# Patient Record
Sex: MALE | Race: WHITE | NOT HISPANIC OR LATINO | URBAN - NONMETROPOLITAN AREA
[De-identification: names, ages, dates, MRNs, and addresses within clinical notes are randomized per-mention and may not be internally consistent; named-entity substitution may affect disease eponyms.]

---

## 2020-07-22 ENCOUNTER — APPOINTMENT (EMERGENCY)
Dept: CT IMAGING | Facility: HOSPITAL | Age: 55
End: 2020-07-22
Payer: COMMERCIAL

## 2020-07-22 ENCOUNTER — HOSPITAL ENCOUNTER (EMERGENCY)
Facility: HOSPITAL | Age: 55
Discharge: HOME | End: 2020-07-22
Attending: EMERGENCY MEDICINE | Admitting: EMERGENCY MEDICINE
Payer: COMMERCIAL

## 2020-07-22 ENCOUNTER — APPOINTMENT (EMERGENCY)
Dept: RADIOLOGY | Facility: HOSPITAL | Age: 55
End: 2020-07-22
Payer: COMMERCIAL

## 2020-07-22 VITALS
HEIGHT: 65 IN | HEART RATE: 82 BPM | SYSTOLIC BLOOD PRESSURE: 178 MMHG | DIASTOLIC BLOOD PRESSURE: 84 MMHG | OXYGEN SATURATION: 100 % | TEMPERATURE: 97.6 F | RESPIRATION RATE: 20 BRPM | WEIGHT: 305.56 LBS | BODY MASS INDEX: 50.91 KG/M2

## 2020-07-22 DIAGNOSIS — H61.22 IMPACTED CERUMEN OF LEFT EAR: ICD-10-CM

## 2020-07-22 DIAGNOSIS — R42 DIZZINESS: ICD-10-CM

## 2020-07-22 DIAGNOSIS — R59.0 CERVICAL LYMPHADENOPATHY: ICD-10-CM

## 2020-07-22 DIAGNOSIS — R59.0 MEDIASTINAL LYMPHADENOPATHY: ICD-10-CM

## 2020-07-22 DIAGNOSIS — I10 ESSENTIAL HYPERTENSION: Primary | ICD-10-CM

## 2020-07-22 DIAGNOSIS — S00.01XA EXCORIATION OF SCALP, INITIAL ENCOUNTER: ICD-10-CM

## 2020-07-22 LAB
ALBUMIN SERPL BCP-MCNC: 3 G/DL (ref 3.5–5)
ALP SERPL-CCNC: 38 U/L (ref 46–116)
ALT SERPL W P-5'-P-CCNC: 38 U/L (ref 12–78)
ANION GAP SERPL CALCULATED.3IONS-SCNC: 8 MMOL/L (ref 4–13)
AST SERPL W P-5'-P-CCNC: 19 U/L (ref 5–45)
BASOPHILS # BLD AUTO: 0.06 THOUSANDS/ΜL (ref 0–0.1)
BASOPHILS NFR BLD AUTO: 1 % (ref 0–1)
BILIRUB SERPL-MCNC: 0.29 MG/DL (ref 0.2–1)
BUN SERPL-MCNC: 16 MG/DL (ref 5–25)
CALCIUM SERPL-MCNC: 8.3 MG/DL (ref 8.3–10.1)
CHLORIDE SERPL-SCNC: 103 MMOL/L (ref 100–108)
CO2 SERPL-SCNC: 25 MMOL/L (ref 21–32)
CREAT SERPL-MCNC: 1.16 MG/DL (ref 0.6–1.3)
EOSINOPHIL # BLD AUTO: 0.38 THOUSAND/ΜL (ref 0–0.61)
EOSINOPHIL NFR BLD AUTO: 5 % (ref 0–6)
ERYTHROCYTE [DISTWIDTH] IN BLOOD BY AUTOMATED COUNT: 14.6 % (ref 11.6–15.1)
GFR SERPL CREATININE-BSD FRML MDRD: 72 ML/MIN/1.73SQ M
GLUCOSE SERPL-MCNC: 144 MG/DL (ref 65–140)
HCT VFR BLD AUTO: 42.2 % (ref 36.5–49.3)
HGB BLD-MCNC: 13.5 G/DL (ref 12–17)
IMM GRANULOCYTES # BLD AUTO: 0.14 THOUSAND/UL (ref 0–0.2)
IMM GRANULOCYTES NFR BLD AUTO: 2 % (ref 0–2)
LACTATE SERPL-SCNC: 1.1 MMOL/L (ref 0.5–2)
LYMPHOCYTES # BLD AUTO: 0.97 THOUSANDS/ΜL (ref 0.6–4.47)
LYMPHOCYTES NFR BLD AUTO: 13 % (ref 14–44)
MCH RBC QN AUTO: 27.8 PG (ref 26.8–34.3)
MCHC RBC AUTO-ENTMCNC: 32 G/DL (ref 31.4–37.4)
MCV RBC AUTO: 87 FL (ref 82–98)
MONOCYTES # BLD AUTO: 1.16 THOUSAND/ΜL (ref 0.17–1.22)
MONOCYTES NFR BLD AUTO: 15 % (ref 4–12)
NEUTROPHILS # BLD AUTO: 4.93 THOUSANDS/ΜL (ref 1.85–7.62)
NEUTS SEG NFR BLD AUTO: 64 % (ref 43–75)
NRBC BLD AUTO-RTO: 0 /100 WBCS
PLATELET # BLD AUTO: 337 THOUSANDS/UL (ref 149–390)
PMV BLD AUTO: 8.9 FL (ref 8.9–12.7)
POTASSIUM SERPL-SCNC: 3.9 MMOL/L (ref 3.5–5.3)
PROT SERPL-MCNC: 7.6 G/DL (ref 6.4–8.2)
RBC # BLD AUTO: 4.85 MILLION/UL (ref 3.88–5.62)
SODIUM SERPL-SCNC: 136 MMOL/L (ref 136–145)
TROPONIN I SERPL-MCNC: <0.02 NG/ML
WBC # BLD AUTO: 7.64 THOUSAND/UL (ref 4.31–10.16)

## 2020-07-22 PROCEDURE — 90715 TDAP VACCINE 7 YRS/> IM: CPT | Performed by: EMERGENCY MEDICINE

## 2020-07-22 PROCEDURE — 71045 X-RAY EXAM CHEST 1 VIEW: CPT

## 2020-07-22 PROCEDURE — 80053 COMPREHEN METABOLIC PANEL: CPT

## 2020-07-22 PROCEDURE — 85025 COMPLETE CBC W/AUTO DIFF WBC: CPT

## 2020-07-22 PROCEDURE — 70491 CT SOFT TISSUE NECK W/DYE: CPT

## 2020-07-22 PROCEDURE — 69210 REMOVE IMPACTED EAR WAX UNI: CPT | Performed by: EMERGENCY MEDICINE

## 2020-07-22 PROCEDURE — 96374 THER/PROPH/DIAG INJ IV PUSH: CPT

## 2020-07-22 PROCEDURE — 90471 IMMUNIZATION ADMIN: CPT

## 2020-07-22 PROCEDURE — 99284 EMERGENCY DEPT VISIT MOD MDM: CPT

## 2020-07-22 PROCEDURE — 93005 ELECTROCARDIOGRAM TRACING: CPT

## 2020-07-22 PROCEDURE — 83605 ASSAY OF LACTIC ACID: CPT | Performed by: EMERGENCY MEDICINE

## 2020-07-22 PROCEDURE — 99285 EMERGENCY DEPT VISIT HI MDM: CPT | Performed by: EMERGENCY MEDICINE

## 2020-07-22 PROCEDURE — 96361 HYDRATE IV INFUSION ADD-ON: CPT

## 2020-07-22 PROCEDURE — 70450 CT HEAD/BRAIN W/O DYE: CPT

## 2020-07-22 PROCEDURE — 36415 COLL VENOUS BLD VENIPUNCTURE: CPT

## 2020-07-22 PROCEDURE — 84484 ASSAY OF TROPONIN QUANT: CPT

## 2020-07-22 RX ORDER — GINSENG 100 MG
1 CAPSULE ORAL ONCE
Status: COMPLETED | OUTPATIENT
Start: 2020-07-22 | End: 2020-07-22

## 2020-07-22 RX ORDER — LOSARTAN POTASSIUM 50 MG/1
50 TABLET ORAL ONCE
Status: COMPLETED | OUTPATIENT
Start: 2020-07-22 | End: 2020-07-22

## 2020-07-22 RX ORDER — HYDRALAZINE HYDROCHLORIDE 20 MG/ML
10 INJECTION INTRAMUSCULAR; INTRAVENOUS ONCE
Status: COMPLETED | OUTPATIENT
Start: 2020-07-22 | End: 2020-07-22

## 2020-07-22 RX ORDER — CLINDAMYCIN HYDROCHLORIDE 150 MG/1
300 CAPSULE ORAL ONCE
Status: COMPLETED | OUTPATIENT
Start: 2020-07-22 | End: 2020-07-22

## 2020-07-22 RX ORDER — KETOROLAC TROMETHAMINE 30 MG/ML
15 INJECTION, SOLUTION INTRAMUSCULAR; INTRAVENOUS ONCE
Status: COMPLETED | OUTPATIENT
Start: 2020-07-22 | End: 2020-07-22

## 2020-07-22 RX ORDER — MECLIZINE HYDROCHLORIDE 25 MG/1
25 TABLET ORAL 3 TIMES DAILY PRN
Qty: 30 TABLET | Refills: 0 | Status: SHIPPED | OUTPATIENT
Start: 2020-07-22 | End: 2021-05-12

## 2020-07-22 RX ORDER — LISINOPRIL 10 MG/1
30 TABLET ORAL ONCE
Status: COMPLETED | OUTPATIENT
Start: 2020-07-22 | End: 2020-07-22

## 2020-07-22 RX ORDER — CLINDAMYCIN HYDROCHLORIDE 150 MG/1
450 CAPSULE ORAL EVERY 8 HOURS SCHEDULED
Qty: 63 CAPSULE | Refills: 0 | Status: SHIPPED | OUTPATIENT
Start: 2020-07-22 | End: 2020-07-29

## 2020-07-22 RX ADMIN — CLINDAMYCIN HYDROCHLORIDE 300 MG: 150 CAPSULE ORAL at 14:40

## 2020-07-22 RX ADMIN — HYDRALAZINE HYDROCHLORIDE 10 MG: 20 INJECTION INTRAMUSCULAR; INTRAVENOUS at 13:56

## 2020-07-22 RX ADMIN — CARBAMIDE PEROXIDE 6.5% 5 DROP: 6.5 LIQUID AURICULAR (OTIC) at 12:07

## 2020-07-22 RX ADMIN — KETOROLAC TROMETHAMINE 15 MG: 30 INJECTION, SOLUTION INTRAMUSCULAR at 12:07

## 2020-07-22 RX ADMIN — IOHEXOL 100 ML: 350 INJECTION, SOLUTION INTRAVENOUS at 13:52

## 2020-07-22 RX ADMIN — BACITRACIN 1 LARGE APPLICATION: 500 OINTMENT TOPICAL at 12:07

## 2020-07-22 RX ADMIN — LOSARTAN POTASSIUM 50 MG: 50 TABLET, FILM COATED ORAL at 14:40

## 2020-07-22 RX ADMIN — SODIUM CHLORIDE 1000 ML: 0.9 INJECTION, SOLUTION INTRAVENOUS at 12:11

## 2020-07-22 RX ADMIN — LISINOPRIL 30 MG: 10 TABLET ORAL at 14:40

## 2020-07-22 RX ADMIN — TETANUS TOXOID, REDUCED DIPHTHERIA TOXOID AND ACELLULAR PERTUSSIS VACCINE, ADSORBED 0.5 ML: 5; 2.5; 8; 8; 2.5 SUSPENSION INTRAMUSCULAR at 12:07

## 2020-07-22 NOTE — ED NOTES
Pt given ear drops to take home to use, pt's wounds on scalp also treated with bacitracin, pt refused to have head wrapped with dressing        Maci Purcell, RN  07/22/20 3098

## 2020-07-22 NOTE — ED PROVIDER NOTES
History  Chief Complaint   Patient presents with    Neck Swelling     "I  had lost a few teeth in the back on the left  NOw i feel slooen lymph nodes and i can feel like pus in my hair and my scalp is on fire "     61-year-old male with a past medical history of hypertension and thyroid disease presents with dental problem one month ago and left-sided neck swelling x1 week  Patient also complaining of oozing from my scalp," of unknown duration  Patient states that about a month ago he had two teeth fall out of his mouth from the top left side  Patient admits to having very poor dentition  Patient states about a week ago he noticed to balls in my neck    Patient states that he also uses a shampoo that makes his head itch and he admits to scratching his head vigorously  Shortly thereafter he noticed burning and oozing from my head!" Patient denies history of cancer, otitis externa, mastoiditis, shingles or recent infections  Patient denies fever, chills, neck stiffness, headache, visual or hearing changes, ear pain, neck pain, difficulty breathing, cough, sputum production, shortness of breath, chest pain, rash, back pain, abdominal pain, urinary symptoms, nausea, vomiting, diarrhea  Denies focal motor sensory deficits  Denies neck trauma, fall or syncope  No medications taken at home  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 due to COVID-19 pandemic including Bonnet, face mask, eye goggles and gloves        History provided by:  Patient   used: No    Neck Pain   Pain location:  L side  Quality:  Unable to specify  Pain severity:  Unable to specify  Onset quality:  Gradual  Duration:  1 week  Timing:  Constant  Progression:  Worsening  Chronicity:  New  Context: not fall, not jumping from heights, not lifting a heavy object, not MCA, not MVC, not pedestrian accident and not recent injury    Relieved by:  Nothing  Worsened by:  Nothing  Ineffective treatments:  None tried  Associated symptoms: no bladder incontinence, no bowel incontinence, no chest pain, no fever, no headaches, no leg pain, no numbness, no paresis, no photophobia, no syncope, no tingling, no visual change, no weakness and no weight loss    Risk factors: no hx of head and neck radiation, no hx of osteoporosis, no hx of spinal trauma, no recent epidural, no recent head injury and no recurrent falls        None       Past Medical History:   Diagnosis Date    Disease of thyroid gland     Hypertension        History reviewed  No pertinent surgical history  History reviewed  No pertinent family history  I have reviewed and agree with the history as documented  E-Cigarette/Vaping    E-Cigarette Use Never User      E-Cigarette/Vaping Substances     Social History     Tobacco Use    Smoking status: Never Smoker    Smokeless tobacco: Never Used   Substance Use Topics    Alcohol use: Yes     Frequency: 2-3 times a week    Drug use: Never       Review of Systems   Constitutional: Negative for chills, diaphoresis, fatigue, fever and weight loss  HENT: Positive for dental problem and facial swelling (Two lumps in left lateral neck)  Negative for congestion, drooling, ear discharge, ear pain, hearing loss, mouth sores, nosebleeds, postnasal drip, rhinorrhea, sinus pressure, sinus pain, sneezing, sore throat, tinnitus, trouble swallowing and voice change  Eyes: Negative for photophobia, pain, redness and visual disturbance  Respiratory: Negative for cough, choking, chest tightness, shortness of breath, wheezing and stridor  Cardiovascular: Negative for chest pain, palpitations, leg swelling and syncope  Gastrointestinal: Negative for abdominal pain, bowel incontinence, constipation, diarrhea, nausea and vomiting     Genitourinary: Negative for bladder incontinence, decreased urine volume, difficulty urinating, discharge, dysuria, flank pain, frequency, hematuria, penile pain, penile swelling, scrotal swelling, testicular pain and urgency  Musculoskeletal: Negative for arthralgias, back pain, gait problem, joint swelling, myalgias, neck pain and neck stiffness  Skin: Positive for wound  Negative for color change, pallor and rash  Allergic/Immunologic: Negative for immunocompromised state  Neurological: Negative for dizziness, tingling, tremors, seizures, syncope, facial asymmetry, speech difficulty, weakness, light-headedness, numbness and headaches  Hematological: Negative for adenopathy  Psychiatric/Behavioral: Negative for agitation, confusion, hallucinations and suicidal ideas  The patient is not nervous/anxious  Physical Exam  Physical Exam   Constitutional: He is oriented to person, place, and time  He appears well-developed and well-nourished  He is cooperative  Non-toxic appearance  He does not have a sickly appearance  He does not appear ill  No distress  HENT:   Head: Normocephalic  Head is with laceration  Head is without raccoon's eyes, without Almazan's sign, without abrasion, without contusion, without right periorbital erythema and without left periorbital erythema  Hair is normal        Right Ear: Hearing, tympanic membrane, external ear and ear canal normal  No drainage or tenderness  No mastoid tenderness  Tympanic membrane is not injected, not scarred, not perforated, not erythematous, not retracted and not bulging  Tympanic membrane mobility is normal  No middle ear effusion  No hemotympanum  Left Ear: Hearing, tympanic membrane and ear canal normal  There is drainage  No tenderness  There is mastoid tenderness  Tympanic membrane is not injected, not scarred, not perforated, not erythematous, not retracted and not bulging  Tympanic membrane mobility is normal  No hemotympanum  Nose: Nose normal  Right sinus exhibits no maxillary sinus tenderness and no frontal sinus tenderness  Left sinus exhibits no maxillary sinus tenderness and no frontal sinus tenderness  Mouth/Throat: Uvula is midline, oropharynx is clear and moist and mucous membranes are normal  No oral lesions  No trismus in the jaw  Abnormal dentition  Dental caries present  No dental abscesses, uvula swelling or lacerations  No oropharyngeal exudate, posterior oropharyngeal edema, posterior oropharyngeal erythema or tonsillar abscesses  No tonsillar exudate  Multiple areas of excoriation to occipital scalp; serosanguinous oozing; no crepitus, step-off, deformity; no bleeding; no rash, erythema; no contusions; left ear has impacted cerumen; very mild tenderness and erythema around left mastoid; no oral lesions, abscess   Eyes: Pupils are equal, round, and reactive to light  Conjunctivae, EOM and lids are normal    Neck: Trachea normal, normal range of motion, full passive range of motion without pain and phonation normal  Neck supple  No JVD present  No spinous process tenderness and no muscular tenderness present  No neck rigidity  No edema, no erythema and normal range of motion present  No Brudzinski's sign and no Kernig's sign noted  No thyroid mass and no thyromegaly present  Multiple non-tender, mobile lymph nodes palpated   Cardiovascular: Normal rate, regular rhythm, S1 normal, S2 normal, normal heart sounds, intact distal pulses and normal pulses  Pulses:       Radial pulses are 2+ on the right side, and 2+ on the left side  Dorsalis pedis pulses are 2+ on the right side, and 2+ on the left side  Pulmonary/Chest: Effort normal and breath sounds normal  No accessory muscle usage or stridor  No tachypnea  No respiratory distress  He has no decreased breath sounds  He has no wheezes  He has no rhonchi  He has no rales  He exhibits no mass, no tenderness, no deformity and no retraction  Abdominal: Soft  Bowel sounds are normal  He exhibits no distension, no ascites and no mass  There is no hepatosplenomegaly  There is no tenderness   There is no rigidity, no rebound, no guarding, no CVA tenderness, no tenderness at McBurney's point and negative Burroughs's sign  No hernia  Genitourinary: Penis normal  No penile tenderness  Musculoskeletal: Normal range of motion  He exhibits no edema, tenderness or deformity  Lymphadenopathy:        Head (left side): Preauricular adenopathy present  He has cervical adenopathy  Left cervical: Deep cervical adenopathy present  Left: Supraclavicular adenopathy present  Neurological: He is alert and oriented to person, place, and time  He has normal strength  He is not disoriented  He displays no atrophy and no tremor  No cranial nerve deficit or sensory deficit  He exhibits normal muscle tone  He displays a negative Romberg sign  He displays no seizure activity  Coordination and gait normal  GCS eye subscore is 4  GCS verbal subscore is 5  GCS motor subscore is 6  Patient is AAOx4, GCS 15; speaking clearly and appropriately; motor and sensation intact; visual fields intact; cranial nerves II-XII grossly intact; no facial droop, slurred speech or arm drift   Skin: Skin is warm, dry and intact  Capillary refill takes less than 2 seconds  No abrasion, no bruising, no burn, no ecchymosis, no laceration, no lesion, no petechiae and no rash noted  Rash is not maculopapular  He is not diaphoretic  No erythema  No pallor  Psychiatric: He has a normal mood and affect  His speech is normal and behavior is normal  Judgment and thought content normal  He is not actively hallucinating  Cognition and memory are normal  He is attentive  Nursing note and vitals reviewed        Vital Signs  ED Triage Vitals [07/22/20 1108]   Temperature Pulse Respirations Blood Pressure SpO2   97 6 °F (36 4 °C) 84 22 (!) 193/109 98 %      Temp Source Heart Rate Source Patient Position - Orthostatic VS BP Location FiO2 (%)   Temporal Monitor Lying Right arm --      Pain Score       6           Vitals:    07/22/20 1108 07/22/20 1230 07/22/20 1300 07/22/20 1430   BP: (!) 193/109 (!) 181/96 (!) 190/103 (!) 178/84   Pulse: 84 79 74 82   Patient Position - Orthostatic VS: Lying Lying Lying Lying         Visual Acuity      ED Medications  Medications   tetanus-diphtheria-acellular pertussis (BOOSTRIX) IM injection 0 5 mL (0 5 mL Intramuscular Given 7/22/20 1207)   carbamide peroxide (DEBROX) 6 5 % otic solution 5 drop (5 drops Both Ears Given 7/22/20 1207)   bacitracin topical ointment 1 large application (1 large application Topical Given 7/22/20 1207)   ketorolac (TORADOL) injection 15 mg (15 mg Intravenous Given 7/22/20 1207)   sodium chloride 0 9 % bolus 1,000 mL (0 mL Intravenous Stopped 7/22/20 1441)   hydrALAZINE (APRESOLINE) injection 10 mg (10 mg Intravenous Given 7/22/20 1356)   iohexol (OMNIPAQUE) 350 MG/ML injection (MULTI-DOSE) 100 mL (100 mL Intravenous Given 7/22/20 1352)   clindamycin (CLEOCIN) capsule 300 mg (300 mg Oral Given 7/22/20 1440)   losartan (COZAAR) tablet 50 mg (50 mg Oral Given 7/22/20 1440)   lisinopril (ZESTRIL) tablet 30 mg (30 mg Oral Given 7/22/20 1440)       Diagnostic Studies  Results Reviewed     Procedure Component Value Units Date/Time    Lactic acid, plasma [[id number]]  (Normal) Collected:  07/22/20 1212    Lab Status:  Final result Specimen:  Blood from Arm, Right Updated:  07/22/20 1237     LACTIC ACID 1 1 mmol/L     Narrative:       Result may be elevated if tourniquet was used during collection      Comprehensive metabolic panel [[id number]]  (Abnormal) Collected:  07/22/20 1121    Lab Status:  Final result Specimen:  Blood from Arm, Right Updated:  07/22/20 1146     Sodium 136 mmol/L      Potassium 3 9 mmol/L      Chloride 103 mmol/L      CO2 25 mmol/L      ANION GAP 8 mmol/L      BUN 16 mg/dL      Creatinine 1 16 mg/dL      Glucose 144 mg/dL      Calcium 8 3 mg/dL      AST 19 U/L      ALT 38 U/L      Alkaline Phosphatase 38 U/L      Total Protein 7 6 g/dL      Albumin 3 0 g/dL      Total Bilirubin 0 29 mg/dL      eGFR 72 ml/min/1 73sq m Narrative:       National Kid[employer] Disease Foundation guidelines for Chronic Kidney Disease (CKD):     Stage 1 with normal or high GFR (GFR > 90 mL/min/1 73 square meters)    Stage 2 Mild CKD (GFR = 60-89 mL/min/1 73 square meters)    Stage 3A Moderate CKD (GFR = 45-59 mL/min/1 73 square meters)    Stage 3B Moderate CKD (GFR = 30-44 mL/min/1 73 square meters)    Stage 4 Severe CKD (GFR = 15-29 mL/min/1 73 square meters)    Stage 5 End Stage CKD (GFR <15 mL/min/1 73 square meters)  Note: GFR calculation is accurate only with a steady state creatinine    Troponin I [[id number]]  (Normal) Collected:  07/22/20 1121    Lab Status:  Final result Specimen:  Blood from Arm, Right Updated:  07/22/20 1144     Troponin I <0 02 ng/mL     Grandy draw [[id number]] Collected:  07/22/20 1121    Lab Status:  Final result Specimen:  Blood from Arm, Right Updated:  07/22/20 1132    Narrative: The following orders were created for panel order Grandy draw  Procedure                               Abnormality         Status                     ---------                               -----------         ------                     Patti Dsouza on [facility][[id number]]                           Final result                 Please view results for these tests on the individual orders      CBC and differential [[id number]]  (Abnormal) Collected:  07/22/20 1121    Lab Status:  Final result Specimen:  Blood from Arm, Right Updated:  07/22/20 1128     WBC 7 64 Thousand/uL      RBC 4 85 Million/uL      Hemoglobin 13 5 g/dL      Hematocrit 42 2 %      MCV 87 fL      MCH 27 8 pg      MCHC 32 0 g/dL      RDW 14 6 %      MPV 8 9 fL      Platelets 657 Thousands/uL      nRBC 0 /100 WBCs      Neutrophils Relative 64 %      Immat GRANS % 2 %      Lymphocytes Relative 13 %      Monocytes Relative 15 %      Eosinophils Relative 5 %      Basophils Relative 1 %      Neutrophils Absolute 4 93 Thousands/µL      Immature Grans Absolute 0 14 Thousand/uL Lymphocytes Absolute 0 97 Thousands/µL      Monocytes Absolute 1 16 Thousand/µL      Eosinophils Absolute 0 38 Thousand/µL      Basophils Absolute 0 06 Thousands/µL                  CT soft tissue neck with contrast   Final Result by Satya Matos MD (07/22 1411)      Partially imaged left parietal scalp soft tissue swelling may be infectious in nature  No discrete rim-enhancing collections identified  Cervical lymphadenopathy as well as partially imaged mediastinal lymphadenopathy, as described above  Mild stranding is noted surrounding left level 5 lymph nodes  Findings may be on the basis of lymphadenitis, however short-term interval follow-up    after appropriate treatment is recommended to exclude underlying neoplastic etiologies  No discrete nodular enhancing lesions along the course of the aerodigestive tract  The study was marked in EPIC for significant notification  Workstation performed: [id number]         CT head without contrast   Final Result by Neysa Cushing, MD (07/22 1350)      Soft tissue swelling, skin thickening in the scalp in the left occipital region without drainable fluid collection  No lytic lesion   No acute intracranial hemorrhage                     Workstation performed: [id number]         XR chest 1 view portable   Final Result by Monico Liang MD (07/22 1223)      No acute cardiopulmonary disease  Cardiomegaly            Workstation performed: [id number]                    Procedures  Ear cerumen removal  Date/Time: 7/22/2020 1:01 PM  Performed by: Obed Chamberlain MD  Authorized by:  Obed Chamberlain MD     Patient location:  ED  Consent:     Consent obtained:  Verbal    Consent given by:  Patient    Risks discussed:  Bleeding, infection, pain, TM perforation, incomplete removal and dizziness    Alternatives discussed:  Alternative treatment  Universal protocol:     Procedure explained and questions answered to patient or proxy's satisfaction: yes Patient identity confirmed:  Verbally with patient  Procedure details:     Local anesthetic:  None    Location:  L ear    Procedure type: irrigation with instrumentation      Instrumentation: curette      Approach:  External    Equipment used:  Scoop curette after Debrox ear drops  Post-procedure details:     Complication:  None    Hearing quality:  Normal    Patient tolerance of procedure:  Procedure terminated at patient's request  Comments:      Moderate amount of cerumen removed before patient requested procedure stopped due to ear discomfort  ECG 12 Lead Documentation Only  Date/Time: 7/22/2020 1:04 PM  Performed by: Gilford Halon, MD  Authorized by: Gilford Halon, MD     ECG reviewed by me, the ED Provider: yes    Previous ECG:     Comparison to cardiac monitor: Yes    Interpretation:     Interpretation: normal    Rate:     ECG rate:  85bpm    ECG rate assessment: normal    Rhythm:     Rhythm: sinus rhythm    Ectopy:     Ectopy: none    QRS:     QRS axis:  Left  Conduction:     Conduction: normal    ST segments:     ST segments:  Normal  T waves:     T waves: flattening and inverted      Flattening:  AVL    Inverted:  AVR  Comments:      No STEMI  GA 142ms  QRS 82ms  QT 449ms             ED Course  ED Course as of Jul 25 1647   Wed Jul 22, 2020   1301 CXR:IMPRESSION:     No acute cardiopulmonary disease      Cardiomegaly            1415 CT soft tissue:IMPRESSION:     Partially imaged left parietal scalp soft tissue swelling may be infectious in nature  No discrete rim-enhancing collections identified      Cervical lymphadenopathy as well as partially imaged mediastinal lymphadenopathy, as described above  Mild stranding is noted surrounding left level 5 lymph nodes    Findings may be on the basis of lymphadenitis, however short-term interval follow-up   after appropriate treatment is recommended to exclude underlying neoplastic etiologies      No discrete nodular enhancing lesions along the course of the aerodigestive tract      The study was marked in EPIC for significant notification  1416 CTH:IMPRESSION:     Soft tissue swelling, skin thickening in the scalp in the left occipital region without drainable fluid collection  No lytic lesion  No acute intracranial hemorrhage            1418 Spoke with Dr Rex Miramontes, Radiology  There is no sign of mastoiditis  Lymphadenopathy is likely secondary to occipital scalp excoriations but does require antibiotics and outpatient PCP follow-up  22 [facility] patient  His scalp wounds have been cleaned by nurse and Bacitracin has been applied along with bandages  Advised patient to have wound check in two days with his PCP  Reviewed labs, inaging and plan for discharge to home with PCP follow-up for wound check and outpatient follow-up for lymphadenopathy  Patient has received home hypertension medications, BP now 178/84  Will discharge now on clindamycin  Work note provided  US AUDIT      Most Recent Value   Initial Alcohol Screen: US AUDIT-C    1  How often do you have a drink containing alcohol? 3 Filed at: 07/22/2020 1111   2  How many drinks containing alcohol do you have on a typical day you are drinking? 2 Filed at: 07/22/2020 1111   3a  Male UNDER 65: How often do you have five or more drinks on one occasion? 0 Filed at: 07/22/2020 1111   Audit-C Score  5 Filed at: 07/22/2020 1111                  MIGNON/DAST-10      Most Recent Value   How many times in the past year have you    Used an illegal drug or used a prescription medication for non-medical reasons?   Never Filed at: 07/22/2020 1112          MDM  Number of Diagnoses or Management Options  Cervical lymphadenopathy:   Dizziness:   Essential hypertension:   Excoriation of scalp, initial encounter:   Impacted cerumen of left ear:   Mediastinal lymphadenopathy:      Amount and/or Complexity of Data Reviewed  Clinical lab tests: reviewed and ordered  Tests in the radiology section of CPT®: reviewed and ordered  Tests in the medicine section of CPT®: ordered and reviewed  Review and summarize past medical records: yes  Discuss the patient with other providers: yes  Independent visualization of images, tracings, or specimens: yes (CTH, CT soft tissue)    Risk of Complications, Morbidity, and/or Mortality  Presenting problems: moderate  Diagnostic procedures: moderate  Management options: moderate    Patient Progress  Patient progress: stable        Disposition  Final diagnoses:   Essential hypertension   Impacted cerumen of left ear   Excoriation of scalp, initial encounter   Cervical lymphadenopathy   Mediastinal lymphadenopathy   Dizziness     Time reflects when diagnosis was documented in both MDM as applicable and the Disposition within this note     Time User Action Codes Description Comment    7/22/2020  2:24 PM [facility]rist[facility] Essential hypertension     7/22/2020  2:51 PM Levada Idol Add [H61 22] Impacted cerumen of left ear     7/22/2020  2:51 PM Levada Idol Add [S00 01XA] Excoriation of scalp, initial encounter     7/22/2020  2:52 PM Levada Idol Add [R59 0] Cervical lymphadenopathy     7/22/2020  2:52 PM Levada Idol Add [R59 0] Mediastinal lymphadenopathy     7/22/2020  2:53 PM Levada Idol Add [R42] Dizziness       ED Disposition     ED Disposition Condition Date/Time Comment    Discharge Stable Wed Jul 22, 2020  2:51 PM Marlon Mcmillan discharge to home/self care  Follow-up Information     Follow up With Specialties Details Why Diamond Hernandez MD Internal Medicine Go on 7/24/2020 For wound re-check [address] [facility] [phone]          Please follow-up with your primary care provider regarding your swollen lymph nodes in your neck  You need to have outpatient monitoring and may require subsequent CT neck for re-evaluation            Discharge Medication List as of 7/22/2020  2:56 PM      START taking these medications    Details   clindamycin (CLEOCIN) 150 mg capsule Take 3 capsules (450 mg total) by mouth every 8 (eight) hours for 7 days, Starting Wed 7/22/2020, Until Wed 7/29/2020, Print      meclizine (ANTIVERT) 25 mg tablet Take 1 tablet (25 mg total) by mouth 3 (three) times a day as needed for dizziness, Starting Wed 7/22/2020, Print           No discharge procedures on file      PDMP Review     None          ED Provider  Electronically Signed by      MD Jose Hancock MD  07/22/20 3527 Cyndi Wheatley MD  07/25/20 3637

## 2020-07-25 ENCOUNTER — HOSPITAL ENCOUNTER (EMERGENCY)
Facility: HOSPITAL | Age: 55
Discharge: HOME | End: 2020-07-25
Attending: EMERGENCY MEDICINE | Admitting: EMERGENCY MEDICINE
Payer: COMMERCIAL

## 2020-07-25 VITALS
TEMPERATURE: 98 F | HEART RATE: 66 BPM | SYSTOLIC BLOOD PRESSURE: 152 MMHG | OXYGEN SATURATION: 99 % | DIASTOLIC BLOOD PRESSURE: 90 MMHG | WEIGHT: 305 LBS | BODY MASS INDEX: 50.75 KG/M2 | RESPIRATION RATE: 16 BRPM

## 2020-07-25 DIAGNOSIS — R42 DIZZINESS: ICD-10-CM

## 2020-07-25 DIAGNOSIS — R42 VERTIGO: Primary | ICD-10-CM

## 2020-07-25 DIAGNOSIS — I88.9 ADENITIS: ICD-10-CM

## 2020-07-25 DIAGNOSIS — E86.0 MILD DEHYDRATION: ICD-10-CM

## 2020-07-25 LAB
ALBUMIN SERPL BCP-MCNC: 3.3 G/DL (ref 3.5–5)
ALP SERPL-CCNC: 37 U/L (ref 46–116)
ALT SERPL W P-5'-P-CCNC: 34 U/L (ref 12–78)
ANION GAP SERPL CALCULATED.3IONS-SCNC: 10 MMOL/L (ref 4–13)
ANION GAP SERPL CALCULATED.3IONS-SCNC: 7 MMOL/L (ref 4–13)
AST SERPL W P-5'-P-CCNC: 17 U/L (ref 5–45)
BASOPHILS # BLD MANUAL: 0.22 THOUSAND/UL (ref 0–0.1)
BASOPHILS NFR MAR MANUAL: 3 % (ref 0–1)
BILIRUB SERPL-MCNC: 0.24 MG/DL (ref 0.2–1)
BUN SERPL-MCNC: 19 MG/DL (ref 5–25)
BUN SERPL-MCNC: 21 MG/DL (ref 5–25)
CALCIUM SERPL-MCNC: 8 MG/DL (ref 8.3–10.1)
CALCIUM SERPL-MCNC: 8.4 MG/DL (ref 8.3–10.1)
CHLORIDE SERPL-SCNC: 101 MMOL/L (ref 100–108)
CHLORIDE SERPL-SCNC: 103 MMOL/L (ref 100–108)
CO2 SERPL-SCNC: 23 MMOL/L (ref 21–32)
CO2 SERPL-SCNC: 26 MMOL/L (ref 21–32)
CREAT SERPL-MCNC: 1.22 MG/DL (ref 0.6–1.3)
CREAT SERPL-MCNC: 1.44 MG/DL (ref 0.6–1.3)
EOSINOPHIL # BLD MANUAL: 0.29 THOUSAND/UL (ref 0–0.4)
EOSINOPHIL NFR BLD MANUAL: 4 % (ref 0–6)
ERYTHROCYTE [DISTWIDTH] IN BLOOD BY AUTOMATED COUNT: 14.7 % (ref 11.6–15.1)
GFR SERPL CREATININE-BSD FRML MDRD: 55 ML/MIN/1.73SQ M
GFR SERPL CREATININE-BSD FRML MDRD: 68 ML/MIN/1.73SQ M
GLUCOSE SERPL-MCNC: 137 MG/DL (ref 65–140)
GLUCOSE SERPL-MCNC: 187 MG/DL (ref 65–140)
HCT VFR BLD AUTO: 47.6 % (ref 36.5–49.3)
HGB BLD-MCNC: 15.3 G/DL (ref 12–17)
LACTATE SERPL-SCNC: 1.5 MMOL/L (ref 0.5–2)
LACTATE SERPL-SCNC: 2.6 MMOL/L (ref 0.5–2)
LYMPHOCYTES # BLD AUTO: 0.51 THOUSAND/UL (ref 0.6–4.47)
LYMPHOCYTES # BLD AUTO: 7 % (ref 14–44)
MCH RBC QN AUTO: 27.7 PG (ref 26.8–34.3)
MCHC RBC AUTO-ENTMCNC: 32.1 G/DL (ref 31.4–37.4)
MCV RBC AUTO: 86 FL (ref 82–98)
METAMYELOCYTES NFR BLD MANUAL: 2 % (ref 0–1)
MONOCYTES # BLD AUTO: 0.95 THOUSAND/UL (ref 0–1.22)
MONOCYTES NFR BLD: 13 % (ref 4–12)
NEUTROPHILS # BLD MANUAL: 5.13 THOUSAND/UL (ref 1.85–7.62)
NEUTS BAND NFR BLD MANUAL: 4 % (ref 0–8)
NEUTS SEG NFR BLD AUTO: 66 % (ref 43–75)
NRBC BLD AUTO-RTO: 0 /100 WBCS
PLATELET # BLD AUTO: 401 THOUSANDS/UL (ref 149–390)
PLATELET BLD QL SMEAR: ADEQUATE
PMV BLD AUTO: 8.7 FL (ref 8.9–12.7)
POTASSIUM SERPL-SCNC: 4.1 MMOL/L (ref 3.5–5.3)
POTASSIUM SERPL-SCNC: 4.3 MMOL/L (ref 3.5–5.3)
PROT SERPL-MCNC: 8.5 G/DL (ref 6.4–8.2)
RBC # BLD AUTO: 5.53 MILLION/UL (ref 3.88–5.62)
RBC MORPH BLD: NORMAL
SODIUM SERPL-SCNC: 134 MMOL/L (ref 136–145)
SODIUM SERPL-SCNC: 136 MMOL/L (ref 136–145)
TOTAL CELLS COUNTED SPEC: 100
TROPONIN I SERPL-MCNC: <0.02 NG/ML
VARIANT LYMPHS # BLD AUTO: 1 %
WBC # BLD AUTO: 7.33 THOUSAND/UL (ref 4.31–10.16)

## 2020-07-25 PROCEDURE — 80048 BASIC METABOLIC PNL TOTAL CA: CPT | Performed by: EMERGENCY MEDICINE

## 2020-07-25 PROCEDURE — 36415 COLL VENOUS BLD VENIPUNCTURE: CPT | Performed by: EMERGENCY MEDICINE

## 2020-07-25 PROCEDURE — 99285 EMERGENCY DEPT VISIT HI MDM: CPT | Performed by: EMERGENCY MEDICINE

## 2020-07-25 PROCEDURE — 85007 BL SMEAR W/DIFF WBC COUNT: CPT | Performed by: EMERGENCY MEDICINE

## 2020-07-25 PROCEDURE — 85027 COMPLETE CBC AUTOMATED: CPT | Performed by: EMERGENCY MEDICINE

## 2020-07-25 PROCEDURE — 93005 ELECTROCARDIOGRAM TRACING: CPT

## 2020-07-25 PROCEDURE — 84484 ASSAY OF TROPONIN QUANT: CPT | Performed by: EMERGENCY MEDICINE

## 2020-07-25 PROCEDURE — 83605 ASSAY OF LACTIC ACID: CPT | Performed by: EMERGENCY MEDICINE

## 2020-07-25 PROCEDURE — 96374 THER/PROPH/DIAG INJ IV PUSH: CPT

## 2020-07-25 PROCEDURE — 96361 HYDRATE IV INFUSION ADD-ON: CPT

## 2020-07-25 PROCEDURE — 96375 TX/PRO/DX INJ NEW DRUG ADDON: CPT

## 2020-07-25 PROCEDURE — 99284 EMERGENCY DEPT VISIT MOD MDM: CPT

## 2020-07-25 PROCEDURE — 80053 COMPREHEN METABOLIC PANEL: CPT | Performed by: EMERGENCY MEDICINE

## 2020-07-25 RX ORDER — DIAZEPAM 5 MG/1
5 TABLET ORAL ONCE
Status: COMPLETED | OUTPATIENT
Start: 2020-07-25 | End: 2020-07-25

## 2020-07-25 RX ORDER — MECLIZINE HYDROCHLORIDE 25 MG/1
25 TABLET ORAL ONCE
Status: COMPLETED | OUTPATIENT
Start: 2020-07-25 | End: 2020-07-25

## 2020-07-25 RX ORDER — ONDANSETRON 2 MG/ML
4 INJECTION INTRAMUSCULAR; INTRAVENOUS ONCE
Status: COMPLETED | OUTPATIENT
Start: 2020-07-25 | End: 2020-07-25

## 2020-07-25 RX ORDER — ONDANSETRON 4 MG/1
4 TABLET, FILM COATED ORAL EVERY 6 HOURS
Qty: 12 TABLET | Refills: 0 | Status: SHIPPED | OUTPATIENT
Start: 2020-07-25 | End: 2021-05-12

## 2020-07-25 RX ORDER — METOCLOPRAMIDE HYDROCHLORIDE 5 MG/ML
10 INJECTION INTRAMUSCULAR; INTRAVENOUS ONCE
Status: COMPLETED | OUTPATIENT
Start: 2020-07-25 | End: 2020-07-25

## 2020-07-25 RX ORDER — DIPHENHYDRAMINE HYDROCHLORIDE 50 MG/ML
25 INJECTION INTRAMUSCULAR; INTRAVENOUS ONCE
Status: COMPLETED | OUTPATIENT
Start: 2020-07-25 | End: 2020-07-25

## 2020-07-25 RX ORDER — DIAZEPAM 5 MG/1
5 TABLET ORAL EVERY 8 HOURS PRN
Qty: 15 TABLET | Refills: 0 | Status: SHIPPED | OUTPATIENT
Start: 2020-07-25 | End: 2021-05-12 | Stop reason: ALTCHOICE

## 2020-07-25 RX ADMIN — SODIUM CHLORIDE 1000 ML: 0.9 INJECTION, SOLUTION INTRAVENOUS at 11:33

## 2020-07-25 RX ADMIN — MECLIZINE HYDROCHLORIDE 25 MG: 25 TABLET ORAL at 11:37

## 2020-07-25 RX ADMIN — DIPHENHYDRAMINE HYDROCHLORIDE 25 MG: 50 INJECTION, SOLUTION INTRAMUSCULAR; INTRAVENOUS at 11:35

## 2020-07-25 RX ADMIN — DIAZEPAM 5 MG: 5 TABLET ORAL at 11:37

## 2020-07-25 RX ADMIN — METOCLOPRAMIDE HYDROCHLORIDE 10 MG: 5 INJECTION INTRAMUSCULAR; INTRAVENOUS at 11:36

## 2020-07-25 RX ADMIN — ONDANSETRON 4 MG: 2 INJECTION INTRAMUSCULAR; INTRAVENOUS at 11:32

## 2020-07-25 RX ADMIN — SODIUM CHLORIDE 1500 ML: 0.9 INJECTION, SOLUTION INTRAVENOUS at 11:34

## 2020-07-25 NOTE — DISCHARGE INSTRUCTIONS
Continue with the antibiotics  Stop taking the meclizine if you are taking the Valium for your dizziness

## 2020-07-25 NOTE — ED PROVIDER NOTES
History  Chief Complaint   Patient presents with    Swollen Glands     dx here a few days ago  is on abt and states the ear is blocked  zofran effective  states he is also dizzy     Seen here few days ago  Had a CT head and neck  Had adenopathy  On clindamycin  States today he had an episode of did buttock  Had nausea and vomiting  Now feels better after Zofran  No change in speech or vision  No focal weakness or numbness  Does have a history of vertigo but this is worse than his normal   Symptoms get worse with movement  Better with lying still  History provided by:  Patient   used: No    Dizziness   Quality:  Head spinning  Severity:  Mild  Onset quality:  Sudden  Timing:  Intermittent  Progression:  Waxing and waning  Chronicity:  New  Context: head movement    Context: not with bowel movement, not with ear pain and not with loss of consciousness    Relieved by:  Being still  Worsened by:  Nothing  Ineffective treatments:  None tried  Associated symptoms: nausea and vomiting    Associated symptoms: no blood in stool, no chest pain, no diarrhea, no headaches, no hearing loss, no palpitations and no shortness of breath        Prior to Admission Medications   Prescriptions Last Dose Informant Patient Reported? Taking? clindamycin (CLEOCIN) 150 mg capsule   No No   Sig: Take 3 capsules (450 mg total) by mouth every 8 (eight) hours for 7 days   meclizine (ANTIVERT) 25 mg tablet   No No   Sig: Take 1 tablet (25 mg total) by mouth 3 (three) times a day as needed for dizziness      Facility-Administered Medications: None       Past Medical History:   Diagnosis Date    Disease of thyroid gland     Hypertension        History reviewed  No pertinent surgical history  History reviewed  No pertinent family history  I have reviewed and agree with the history as documented      E-Cigarette/Vaping    E-Cigarette Use Never User      E-Cigarette/Vaping Substances     Social History Tobacco Use    Smoking status: Never Smoker    Smokeless tobacco: Never Used   Substance Use Topics    Alcohol use: Yes     Frequency: 2-3 times a week    Drug use: Never       Review of Systems   Constitutional: Negative for chills and fever  HENT: Negative for ear pain, hearing loss, sore throat, trouble swallowing and voice change  Eyes: Negative for pain and discharge  Respiratory: Negative for cough, shortness of breath and wheezing  Cardiovascular: Negative for chest pain and palpitations  Gastrointestinal: Positive for nausea and vomiting  Negative for abdominal pain, blood in stool, constipation and diarrhea  Genitourinary: Negative for dysuria, flank pain, frequency and hematuria  Musculoskeletal: Negative for joint swelling, neck pain and neck stiffness  Skin: Negative for rash and wound  Neurological: Positive for dizziness  Negative for seizures, syncope, facial asymmetry and headaches  Psychiatric/Behavioral: Negative for hallucinations, self-injury and suicidal ideas  All other systems reviewed and are negative  Physical Exam  Physical Exam   Constitutional: He is oriented to person, place, and time  He appears well-developed and well-nourished  No distress  HENT:   Head: Normocephalic and atraumatic  Right Ear: External ear normal    Left Ear: External ear normal    Eyes: Pupils are equal, round, and reactive to light  Conjunctivae and EOM are normal    Neck: Normal range of motion  Neck supple  Tenderness and fullness to the left side of the neck  No crepitus  Cardiovascular: Normal rate, regular rhythm and normal heart sounds  No murmur heard  Pulmonary/Chest: Effort normal and breath sounds normal  He has no wheezes  He has no rales  Abdominal: Soft  Bowel sounds are normal  He exhibits no distension  There is no tenderness  There is no rebound and no guarding  Musculoskeletal: Normal range of motion  He exhibits no deformity     Neurological: He is alert and oriented to person, place, and time  No cranial nerve deficit  Skin: Skin is warm and dry  No rash noted  Psychiatric: He has a normal mood and affect  His behavior is normal    Nursing note and vitals reviewed  Vital Signs  ED Triage Vitals [07/25/20 1005]   Temperature Pulse Respirations Blood Pressure SpO2   98 °F (36 7 °C) 74 16 170/85 99 %      Temp Source Heart Rate Source Patient Position - Orthostatic VS BP Location FiO2 (%)   Temporal Monitor -- -- --      Pain Score       No Pain           Vitals:    07/25/20 1005 07/25/20 1100 07/25/20 1130   BP: 170/85 165/83 152/90   Pulse: 74 72 66         Visual Acuity      ED Medications  Medications   sodium chloride 0 9 % bolus 1,000 mL (1,000 mL Intravenous New Bag 7/25/20 1133)   meclizine (ANTIVERT) tablet 25 mg (25 mg Oral Given 7/25/20 1137)   diazepam (VALIUM) tablet 5 mg (5 mg Oral Given 7/25/20 1137)   metoclopramide (REGLAN) injection 10 mg (10 mg Intravenous Given 7/25/20 1136)   diphenhydrAMINE (BENADRYL) injection 25 mg (25 mg Intravenous Given 7/25/20 1135)   ondansetron (ZOFRAN) injection 4 mg (4 mg Intravenous Given 7/25/20 1132)   sodium chloride 0 9 % bolus 1,500 mL (1,500 mL Intravenous New Bag 7/25/20 1134)       Diagnostic Studies  Results Reviewed     Procedure Component Value Units Date/Time    Lactic acid 2 Hours [[id number]]  (Normal) Collected:  07/25/20 1246    Lab Status:  Final result Specimen:  Blood from Arm, Right Updated:  07/25/20 1310     LACTIC ACID 1 5 mmol/L     Narrative:       Result may be elevated if tourniquet was used during collection      Basic metabolic panel [[id number]]  (Abnormal) Collected:  07/25/20 1246    Lab Status:  Final result Specimen:  Blood from Arm, Right Updated:  07/25/20 1301     Sodium 136 mmol/L      Potassium 4 3 mmol/L      Chloride 103 mmol/L      CO2 26 mmol/L      ANION GAP 7 mmol/L      BUN 19 mg/dL      Creatinine 1 22 mg/dL      Glucose 137 mg/dL      Calcium 8 0 mg/dL eGFR 68 ml/min/1 73sq m     Narrative:       [place] guidelines for Chronic Kidney Disease (CKD):     Stage 1 with normal or high GFR (GFR > 90 mL/min/1 73 square meters)    Stage 2 Mild CKD (GFR = 60-89 mL/min/1 73 square meters)    Stage 3A Moderate CKD (GFR = 45-59 mL/min/1 73 square meters)    Stage 3B Moderate CKD (GFR = 30-44 mL/min/1 73 square meters)    Stage 4 Severe CKD (GFR = 15-29 mL/min/1 73 square meters)    Stage 5 End Stage CKD (GFR <15 mL/min/1 73 square meters)  Note: GFR calculation is accurate only with a steady state creatinine    Comprehensive metabolic panel [[id number]]  (Abnormal) Collected:  07/25/20 1021    Lab Status:  Final result Specimen:  Blood from Arm, Left Updated:  07/25/20 1101     Sodium 134 mmol/L      Potassium 4 1 mmol/L      Chloride 101 mmol/L      CO2 23 mmol/L      ANION GAP 10 mmol/L      BUN 21 mg/dL      Creatinine 1 44 mg/dL      Glucose 187 mg/dL      Calcium 8 4 mg/dL      AST 17 U/L      ALT 34 U/L      Alkaline Phosphatase 37 U/L      Total Protein 8 5 g/dL      Albumin 3 3 g/dL      Total Bilirubin 0 24 mg/dL      eGFR 55 ml/min/1 73sq m     Narrative:       [place] guidelines for Chronic Kidney Disease (CKD):     Stage 1 with normal or high GFR (GFR > 90 mL/min/1 73 square meters)    Stage 2 Mild CKD (GFR = 60-89 mL/min/1 73 square meters)    Stage 3A Moderate CKD (GFR = 45-59 mL/min/1 73 square meters)    Stage 3B Moderate CKD (GFR = 30-44 mL/min/1 73 square meters)    Stage 4 Severe CKD (GFR = 15-29 mL/min/1 73 square meters)    Stage 5 End Stage CKD (GFR <15 mL/min/1 73 square meters)  Note: GFR calculation is accurate only with a steady state creatinine    Lactic acid [[id number]]  (Abnormal) Collected:  07/25/20 1021    Lab Status:  Final result Specimen:  Blood from Arm, Left Updated:  07/25/20 1057     LACTIC ACID 2 6 mmol/L     Narrative:       Result may be elevated if tourniquet was used during collection  CBC and differential [[id number]]  (Abnormal) Collected:  07/25/20 1021    Lab Status:  Final result Specimen:  Blood from Arm, Left Updated:  07/25/20 1056     WBC 7 33 Thousand/uL      RBC 5 53 Million/uL      Hemoglobin 15 3 g/dL      Hematocrit 47 6 %      MCV 86 fL      MCH 27 7 pg      MCHC 32 1 g/dL      RDW 14 7 %      MPV 8 7 fL      Platelets 475 Thousands/uL      nRBC 0 /100 WBCs     Narrative: This is an appended report  These results have been appended to a previously verified report  Troponin I [[id number]]  (Normal) Collected:  07/25/20 1021    Lab Status:  Final result Specimen:  Blood from Arm, Left Updated:  07/25/20 1054     Troponin I <0 02 ng/mL                  No orders to display              Procedures  ECG 12 Lead Documentation Only  Date/Time: 7/25/2020 10:14 AM  Performed by: Sravani Hall MD  Authorized by: Sravani Hall MD     ECG reviewed by me, the ED Provider: yes    Patient location:  ED  Rate:     ECG rate:  70  Rhythm:     Rhythm: sinus rhythm    Ectopy:     Ectopy: none    QRS:     QRS axis:  Normal             ED Course  ED Course as of Jul 25 1315   Sat Jul 25, 2020   1059 Patient seen  Feels better  Still has some residual dizziness  Neurologic exam is nonfocal       1311 Patient seen  Feels better  Neurologic exam is nonfocal           US AUDIT      Most Recent Value   Initial Alcohol Screen: US AUDIT-C    1  How often do you have a drink containing alcohol?  0 Filed at: 07/25/2020 0938   2  How many drinks containing alcohol do you have on a typical day you are drinking? 0 Filed at: 07/25/2020 0938   3a  Male UNDER 65: How often do you have five or more drinks on one occasion? 0 Filed at: 07/25/2020 0938   3b  FEMALE Any Age, or MALE 65+: How often do you have 4 or more drinks on one occassion?   0 Filed at: 07/25/2020 0938   Audit-C Score  0 Filed at: 07/25/2020 3167                  MIGNON/DAST-10      Most Recent Value   How many times in the past year have you    Used an illegal drug or used a prescription medication for non-medical reasons? Never Filed at: 07/25/2020 8916                                MDM      Disposition  Final diagnoses:   Vertigo   Mild dehydration   Adenitis   Dizziness     Time reflects when diagnosis was documented in both MDM as applicable and the Disposition within this note     Time User Action Codes Description Comment    7/25/2020  1:05 PM Julieta Midget P Add [R42] Vertigo     7/25/2020  1:05 PM Julieta Midget P Add [E86 0] Mild dehydration     7/25/2020  1:13 PM Deluca Peggy Add [I88 9] Adenitis     7/25/2020  1:14 PM Valarie Benson Add [R42] Dizziness       ED Disposition     ED Disposition Condition Date/Time Comment    Discharge Stable Sat Jul 25, 2020  1:13 PM Angel Nash discharge to home/self care  Follow-up Information    None         Patient's Medications   Discharge Prescriptions    DIAZEPAM (VALIUM) 5 MG TABLET    Take 1 tablet (5 mg total) by mouth every 8 (eight) hours as needed (dizziness) for up to 20 doses       Start Date: 7/25/2020 End Date: --       Order Dose: 5 mg       Quantity: 15 tablet    Refills: 0    ONDANSETRON (ZOFRAN) 4 MG TABLET    Take 1 tablet (4 mg total) by mouth every 6 (six) hours       Start Date: 7/25/2020 End Date: --       Order Dose: 4 mg       Quantity: 12 tablet    Refills: 0     No discharge procedures on file      PDMP Review     None          ED Provider  Electronically Signed by           Agustin Walton MD  07/25/20 0310

## 2020-07-27 LAB
ATRIAL RATE: 68 BPM
ATRIAL RATE: 84 BPM
P AXIS: 117 DEGREES
P AXIS: 51 DEGREES
PR INTERVAL: 134 MS
PR INTERVAL: 142 MS
QRS AXIS: 165 DEGREES
QRS AXIS: 50 DEGREES
QRSD INTERVAL: 84 MS
QRSD INTERVAL: 94 MS
QT INTERVAL: 378 MS
QT INTERVAL: 426 MS
QTC INTERVAL: 446 MS
QTC INTERVAL: 452 MS
T WAVE AXIS: 136 DEGREES
T WAVE AXIS: 52 DEGREES
VENTRICULAR RATE: 68 BPM
VENTRICULAR RATE: 84 BPM

## 2020-07-27 PROCEDURE — 93010 ELECTROCARDIOGRAM REPORT: CPT | Performed by: INTERNAL MEDICINE

## 2020-08-26 ENCOUNTER — HOSPITAL ENCOUNTER (EMERGENCY)
Facility: HOSPITAL | Age: 55
Discharge: HOME | End: 2020-08-26
Attending: EMERGENCY MEDICINE | Admitting: EMERGENCY MEDICINE
Payer: COMMERCIAL

## 2020-08-26 VITALS
HEART RATE: 84 BPM | SYSTOLIC BLOOD PRESSURE: 163 MMHG | TEMPERATURE: 98.6 F | RESPIRATION RATE: 22 BRPM | BODY MASS INDEX: 51.42 KG/M2 | OXYGEN SATURATION: 97 % | WEIGHT: 308.64 LBS | DIASTOLIC BLOOD PRESSURE: 78 MMHG | HEIGHT: 65 IN

## 2020-08-26 VITALS
RESPIRATION RATE: 22 BRPM | DIASTOLIC BLOOD PRESSURE: 75 MMHG | SYSTOLIC BLOOD PRESSURE: 129 MMHG | OXYGEN SATURATION: 99 % | TEMPERATURE: 97.6 F | BODY MASS INDEX: 51.35 KG/M2 | HEIGHT: 65 IN | HEART RATE: 85 BPM | WEIGHT: 308.2 LBS

## 2020-08-26 DIAGNOSIS — I10 HTN (HYPERTENSION): ICD-10-CM

## 2020-08-26 DIAGNOSIS — R42 DIZZINESS: Primary | ICD-10-CM

## 2020-08-26 DIAGNOSIS — R42 VERTIGO: Primary | ICD-10-CM

## 2020-08-26 DIAGNOSIS — N18.9 CKD (CHRONIC KIDNEY DISEASE): ICD-10-CM

## 2020-08-26 LAB
ALBUMIN SERPL BCP-MCNC: 3.3 G/DL (ref 3.5–5)
ALP SERPL-CCNC: 33 U/L (ref 46–116)
ALT SERPL W P-5'-P-CCNC: 30 U/L (ref 12–78)
ANION GAP SERPL CALCULATED.3IONS-SCNC: 8 MMOL/L (ref 4–13)
APTT PPP: 28 SECONDS (ref 23–37)
AST SERPL W P-5'-P-CCNC: 17 U/L (ref 5–45)
ATRIAL RATE: 86 BPM
BASOPHILS # BLD AUTO: 0.04 THOUSANDS/ΜL (ref 0–0.1)
BASOPHILS NFR BLD AUTO: 1 % (ref 0–1)
BILIRUB SERPL-MCNC: 0.45 MG/DL (ref 0.2–1)
BUN SERPL-MCNC: 17 MG/DL (ref 5–25)
CALCIUM SERPL-MCNC: 8.6 MG/DL (ref 8.3–10.1)
CHLORIDE SERPL-SCNC: 103 MMOL/L (ref 100–108)
CO2 SERPL-SCNC: 26 MMOL/L (ref 21–32)
CREAT SERPL-MCNC: 1.37 MG/DL (ref 0.6–1.3)
EOSINOPHIL # BLD AUTO: 0.29 THOUSAND/ΜL (ref 0–0.61)
EOSINOPHIL NFR BLD AUTO: 4 % (ref 0–6)
ERYTHROCYTE [DISTWIDTH] IN BLOOD BY AUTOMATED COUNT: 14.6 % (ref 11.6–15.1)
GFR SERPL CREATININE-BSD FRML MDRD: 59 ML/MIN/1.73SQ M
GLUCOSE SERPL-MCNC: 138 MG/DL (ref 65–140)
HCT VFR BLD AUTO: 46.1 % (ref 36.5–49.3)
HGB BLD-MCNC: 14.9 G/DL (ref 12–17)
IMM GRANULOCYTES # BLD AUTO: 0.08 THOUSAND/UL (ref 0–0.2)
IMM GRANULOCYTES NFR BLD AUTO: 1 % (ref 0–2)
INR PPP: 0.96 (ref 0.84–1.19)
LYMPHOCYTES # BLD AUTO: 0.77 THOUSANDS/ΜL (ref 0.6–4.47)
LYMPHOCYTES NFR BLD AUTO: 10 % (ref 14–44)
MCH RBC QN AUTO: 28.1 PG (ref 26.8–34.3)
MCHC RBC AUTO-ENTMCNC: 32.3 G/DL (ref 31.4–37.4)
MCV RBC AUTO: 87 FL (ref 82–98)
MONOCYTES # BLD AUTO: 1.08 THOUSAND/ΜL (ref 0.17–1.22)
MONOCYTES NFR BLD AUTO: 13 % (ref 4–12)
NEUTROPHILS # BLD AUTO: 5.79 THOUSANDS/ΜL (ref 1.85–7.62)
NEUTS SEG NFR BLD AUTO: 71 % (ref 43–75)
NRBC BLD AUTO-RTO: 0 /100 WBCS
NT-PROBNP SERPL-MCNC: 128 PG/ML
P AXIS: 66 DEGREES
PLATELET # BLD AUTO: 258 THOUSANDS/UL (ref 149–390)
PMV BLD AUTO: 8.9 FL (ref 8.9–12.7)
POTASSIUM SERPL-SCNC: 4.1 MMOL/L (ref 3.5–5.3)
PR INTERVAL: 132 MS
PROT SERPL-MCNC: 7.9 G/DL (ref 6.4–8.2)
PROTHROMBIN TIME: 12.6 SECONDS (ref 11.6–14.5)
QRS AXIS: 47 DEGREES
QRSD INTERVAL: 88 MS
QT INTERVAL: 362 MS
QTC INTERVAL: 433 MS
RBC # BLD AUTO: 5.31 MILLION/UL (ref 3.88–5.62)
SODIUM SERPL-SCNC: 137 MMOL/L (ref 136–145)
T WAVE AXIS: 56 DEGREES
TROPONIN I SERPL-MCNC: <0.02 NG/ML
VENTRICULAR RATE: 86 BPM
WBC # BLD AUTO: 8.05 THOUSAND/UL (ref 4.31–10.16)

## 2020-08-26 PROCEDURE — 85610 PROTHROMBIN TIME: CPT | Performed by: EMERGENCY MEDICINE

## 2020-08-26 PROCEDURE — 80053 COMPREHEN METABOLIC PANEL: CPT | Performed by: EMERGENCY MEDICINE

## 2020-08-26 PROCEDURE — 36415 COLL VENOUS BLD VENIPUNCTURE: CPT | Performed by: EMERGENCY MEDICINE

## 2020-08-26 PROCEDURE — 99284 EMERGENCY DEPT VISIT MOD MDM: CPT

## 2020-08-26 PROCEDURE — 84484 ASSAY OF TROPONIN QUANT: CPT | Performed by: EMERGENCY MEDICINE

## 2020-08-26 PROCEDURE — 85730 THROMBOPLASTIN TIME PARTIAL: CPT | Performed by: EMERGENCY MEDICINE

## 2020-08-26 PROCEDURE — 99285 EMERGENCY DEPT VISIT HI MDM: CPT | Performed by: EMERGENCY MEDICINE

## 2020-08-26 PROCEDURE — 83880 ASSAY OF NATRIURETIC PEPTIDE: CPT | Performed by: EMERGENCY MEDICINE

## 2020-08-26 PROCEDURE — 99283 EMERGENCY DEPT VISIT LOW MDM: CPT

## 2020-08-26 PROCEDURE — 93005 ELECTROCARDIOGRAM TRACING: CPT

## 2020-08-26 PROCEDURE — 99284 EMERGENCY DEPT VISIT MOD MDM: CPT | Performed by: EMERGENCY MEDICINE

## 2020-08-26 PROCEDURE — 85025 COMPLETE CBC W/AUTO DIFF WBC: CPT | Performed by: EMERGENCY MEDICINE

## 2020-08-26 PROCEDURE — 96361 HYDRATE IV INFUSION ADD-ON: CPT

## 2020-08-26 PROCEDURE — 96374 THER/PROPH/DIAG INJ IV PUSH: CPT

## 2020-08-26 RX ORDER — MECLIZINE HYDROCHLORIDE 25 MG/1
25 TABLET ORAL ONCE
Status: COMPLETED | OUTPATIENT
Start: 2020-08-26 | End: 2020-08-26

## 2020-08-26 RX ORDER — LISINOPRIL 40 MG/1
40 TABLET ORAL DAILY
COMMUNITY
End: 2021-05-12 | Stop reason: ALTCHOICE

## 2020-08-26 RX ORDER — CLONIDINE HYDROCHLORIDE 0.1 MG/1
0.2 TABLET ORAL ONCE
Status: COMPLETED | OUTPATIENT
Start: 2020-08-26 | End: 2020-08-26

## 2020-08-26 RX ORDER — HYDROCHLOROTHIAZIDE 25 MG/1
25 TABLET ORAL DAILY
COMMUNITY
Start: 2020-08-11 | End: 2021-08-11

## 2020-08-26 RX ORDER — DIAZEPAM 5 MG/ML
5 INJECTION, SOLUTION INTRAMUSCULAR; INTRAVENOUS ONCE
Status: COMPLETED | OUTPATIENT
Start: 2020-08-26 | End: 2020-08-26

## 2020-08-26 RX ADMIN — CLONIDINE HYDROCHLORIDE 0.2 MG: 0.1 TABLET ORAL at 08:04

## 2020-08-26 RX ADMIN — SODIUM CHLORIDE 1000 ML: 0.9 INJECTION, SOLUTION INTRAVENOUS at 08:03

## 2020-08-26 RX ADMIN — MECLIZINE HYDROCHLORIDE 25 MG: 25 TABLET ORAL at 08:03

## 2020-08-26 RX ADMIN — DIAZEPAM 5 MG: 10 INJECTION, SOLUTION INTRAMUSCULAR; INTRAVENOUS at 08:03

## 2020-08-26 NOTE — ED NOTES
Pt in no acute distress  Pt taken to bus stop  Pt ambulates with a steady gait   Verbalizes understanding of discharge instructions     Adeline Saavedra RN  08/26/20 1107

## 2020-08-26 NOTE — ED PROVIDER NOTES
History  Chief Complaint   Patient presents with    Vertigo - Recurrent     pt d/c'd from ED 30min c/o worsening sx of dizziness w/nausea and left ear pain while waiting for transport home  denies travel/sob/fevers/v/d     Patient is a 17-year-old male recently seen in the emergency department for dizziness and hypertension returns to the ED from the waiting room currently while he was waiting for his bride he became dizzy and anxious again  When returning to the ED and having his vitals taken patient now feeling improved  Patient denies any headache numbness or weakness chest pain or shortness of breath  Patient also has been complaining of pain left ear recently diagnosed with and treated for a cellulitis in the scalp and lymphadenitis this seems to be clinically improving after courses of antibiotics  Prior to Admission Medications   Prescriptions Last Dose Informant Patient Reported? Taking? Levothyroxine Sodium (SYNTHROID PO) 8/25/2020 at Unknown time  Yes Yes   Sig: Take by mouth   diazepam (VALIUM) 5 mg tablet   No No   Sig: Take 1 tablet (5 mg total) by mouth every 8 (eight) hours as needed (dizziness) for up to 20 doses   hydrochlorothiazide (HYDRODIURIL) 25 mg tablet 8/25/2020 at Unknown time  Yes Yes   Sig: Take 25 mg by mouth daily   lisinopril (ZESTRIL) 40 mg tablet 8/25/2020 at Unknown time Self Yes Yes   Sig: Take 40 mg by mouth daily   meclizine (ANTIVERT) 25 mg tablet   No No   Sig: Take 1 tablet (25 mg total) by mouth 3 (three) times a day as needed for dizziness   ondansetron (ZOFRAN) 4 mg tablet   No No   Sig: Take 1 tablet (4 mg total) by mouth every 6 (six) hours      Facility-Administered Medications: None       Past Medical History:   Diagnosis Date    Disease of thyroid gland     Hypertension     Vertigo        History reviewed  No pertinent surgical history  History reviewed  No pertinent family history    I have reviewed and agree with the history as documented  E-Cigarette/Vaping    E-Cigarette Use Never User      E-Cigarette/Vaping Substances     Social History     Tobacco Use    Smoking status: Never Smoker    Smokeless tobacco: Never Used   Substance Use Topics    Alcohol use: Yes     Frequency: 2-3 times a week    Drug use: Never       Review of Systems   Constitutional: Negative for activity change, appetite change, chills, fatigue and fever  HENT: Negative for congestion, ear pain, rhinorrhea and sore throat  Eyes: Negative for discharge, redness and visual disturbance  Respiratory: Negative for cough, chest tightness, shortness of breath and wheezing  Cardiovascular: Negative for chest pain and palpitations  Gastrointestinal: Negative for abdominal pain, constipation, diarrhea, nausea and vomiting  Endocrine: Negative for polydipsia and polyuria  Genitourinary: Negative for difficulty urinating, dysuria, frequency, hematuria and urgency  Musculoskeletal: Negative for arthralgias and myalgias  Skin: Negative for color change, pallor and rash  Neurological: Positive for dizziness and light-headedness  Negative for weakness, numbness and headaches  Hematological: Negative for adenopathy  Does not bruise/bleed easily  Psychiatric/Behavioral: The patient is nervous/anxious  All other systems reviewed and are negative  Physical Exam  Physical Exam  Vitals signs and nursing note reviewed  Constitutional:       Appearance: He is well-developed  HENT:      Head: Normocephalic and atraumatic  Right Ear: External ear normal       Left Ear: External ear normal       Nose: Nose normal    Eyes:      Conjunctiva/sclera: Conjunctivae normal       Pupils: Pupils are equal, round, and reactive to light  Neck:      Musculoskeletal: Normal range of motion and neck supple  Cardiovascular:      Rate and Rhythm: Normal rate and regular rhythm  Heart sounds: Normal heart sounds     Pulmonary:      Effort: Pulmonary effort is normal  No respiratory distress  Breath sounds: Normal breath sounds  No wheezing or rales  Chest:      Chest wall: No tenderness  Abdominal:      General: Bowel sounds are normal  There is no distension  Palpations: Abdomen is soft  Tenderness: There is no abdominal tenderness  There is no guarding  Musculoskeletal: Normal range of motion  Skin:     General: Skin is warm and dry  Neurological:      Mental Status: He is alert and oriented to person, place, and time  Cranial Nerves: No cranial nerve deficit  Sensory: No sensory deficit  Vital Signs  ED Triage Vitals [08/26/20 1034]   Temperature Pulse Respirations Blood Pressure SpO2   98 6 °F (37 °C) 85 22 169/98 98 %      Temp Source Heart Rate Source Patient Position - Orthostatic VS BP Location FiO2 (%)   Temporal Monitor Lying Right arm --      Pain Score       3           Vitals:    08/26/20 1034 08/26/20 1043 08/26/20 1100   BP: 169/98 169/98 163/78   Pulse: 85  84   Patient Position - Orthostatic VS: Lying           Visual Acuity      ED Medications  Medications - No data to display    Diagnostic Studies  Results Reviewed     None                 No orders to display              Procedures  Procedures         ED Course       US AUDIT      Most Recent Value   Initial Alcohol Screen: US AUDIT-C    1  How often do you have a drink containing alcohol? 2 Filed at: 08/26/2020 1038   2  How many drinks containing alcohol do you have on a typical day you are drinking? 2 Filed at: 08/26/2020 1038   3a  Male UNDER 65: How often do you have five or more drinks on one occasion? 2 Filed at: 08/26/2020 1038   Audit-C Score  6 Filed at: 08/26/2020 1038                  MIGNON/DAST-10      Most Recent Value   How many times in the past year have you    Used an illegal drug or used a prescription medication for non-medical reasons?   Never Filed at: 08/26/2020 1038                                MDM  Number of Diagnoses or Management Options  HTN (hypertension): new and does not require workup  Vertigo: new and does not require workup  Diagnosis management comments: Patient remained clinically and hemodynamically stable and neurologically intact on repeat exam he was consoled in the ED and felt improved with rest and further discussion he feels that he needs to get to his PCPs office today to discuss possibility of disability although he likes to work also  The patient is anxious but consolable no SI or HI  After being consoled he does wish to leave the ED and get on the bus today and will report to his PCPs office for his scheduled appointment this afternoon  I did contact the PCP's office and notify of the patient's status and situation also notified of the issues with hypertension and dry cough from lisinopril and make where that the clonidine did work well for the patient in the ED today as consideration could be made to switching him to this or another antihypertensive to help control his blood pressure better and help with his dizziness as well  There is no evidence of or clinical finding consistent with acute or subacute CVA the patient has had recent neuro imaging of the brain  Patient is stable for outpatient follow-up and management this point and does wish to follow-up with his PCP today as advised  Return precautions and anticipatory guidance discussed           Amount and/or Complexity of Data Reviewed  Decide to obtain previous medical records or to obtain history from someone other than the patient: yes  Review and summarize past medical records: yes    Risk of Complications, Morbidity, and/or Mortality  Presenting problems: moderate  Management options: low    Patient Progress  Patient progress: stable        Disposition  Final diagnoses:   Vertigo   HTN (hypertension)     Time reflects when diagnosis was documented in both MDM as applicable and the Disposition within this note     Time User Action Codes Description Comment    8/26/2020 10:41 AM Dale Freshwater Add [R42] Vertigo     8/26/2020 10:45 AM Dale Freshwater Add [I10] HTN (hypertension)       ED Disposition     ED Disposition Condition Date/Time Comment    Discharge Stable Wed Aug 26, 2020 10:41 AM Fito Vasquez discharge to home/self care  Follow-up Information     Follow up With Specialties Details Why [place] Άμμος MD [facility]  [address] [facility] [id number] [phone]            Discharge Medication List as of 8/26/2020 10:45 AM      CONTINUE these medications which have NOT CHANGED    Details   hydrochlorothiazide (HYDRODIURIL) 25 mg tablet Take 25 mg by mouth daily, Starting Tue 8/11/2020, Until Wed 8/11/2021, Historical Med      Levothyroxine Sodium (SYNTHROID PO) Take by mouth, Historical Med      lisinopril (ZESTRIL) 40 mg tablet Take 40 mg by mouth daily, Historical Med      diazepam (VALIUM) 5 mg tablet Take 1 tablet (5 mg total) by mouth every 8 (eight) hours as needed (dizziness) for up to 20 doses, Starting Sat 7/25/2020, Normal      meclizine (ANTIVERT) 25 mg tablet Take 1 tablet (25 mg total) by mouth 3 (three) times a day as needed for dizziness, Starting Wed 7/22/2020, Print      ondansetron (ZOFRAN) 4 mg tablet Take 1 tablet (4 mg total) by mouth every 6 (six) hours, Starting Sat 7/25/2020, Normal           No discharge procedures on file      PDMP Review     None          ED Provider  Electronically Signed by           Amelia Garza DO  08/26/20 1128

## 2020-08-26 NOTE — ED PROVIDER NOTES
History  Chief Complaint   Patient presents with    Vertigo - Recurrent     states 3rd visit in 5weeks for dizziness and vertigo, states unable to work for 5 weeks and needs disability, has appt with PCP today, told EMS he would like to be admitted for his ear infection      Patient is a 61-year-old male who presents the emergency department with complaint of recurrent vertigo the patient reports that he had a very good day yesterday without much dizziness at all this morning he became anxious and more dizzy again described as feeling lightheaded  Patient has also been following with PCP for blood pressure issues and issues with scalp infection and likely folliculitis he recently finished a course of antibiotics  Complains of left ear fullness  No cerumen impaction left ear the left scalp infection and lymphadenitis seems to be improving after finishing 2 courses of antibiotics for this  No fevers or chills no chest pain or shortness of breath no headache  Patient does appear anxious  History provided by:  Patient and EMS personnel  Dizziness   Quality:  Lightheadedness and vertigo  Severity:  Moderate  Onset quality:  Gradual  Duration:  3 weeks  Timing:  Intermittent  Progression:  Waxing and waning  Chronicity:  Recurrent  Context: bowel movement, head movement, physical activity and standing up    Associated symptoms: no chest pain, no diarrhea, no headaches, no nausea, no palpitations, no shortness of breath, no vomiting and no weakness        Prior to Admission Medications   Prescriptions Last Dose Informant Patient Reported?  Taking?   diazepam (VALIUM) 5 mg tablet   No No   Sig: Take 1 tablet (5 mg total) by mouth every 8 (eight) hours as needed (dizziness) for up to 20 doses   meclizine (ANTIVERT) 25 mg tablet   No No   Sig: Take 1 tablet (25 mg total) by mouth 3 (three) times a day as needed for dizziness   ondansetron (ZOFRAN) 4 mg tablet   No No   Sig: Take 1 tablet (4 mg total) by mouth every 6 (six) hours      Facility-Administered Medications: None       Past Medical History:   Diagnosis Date    Disease of thyroid gland     Hypertension     Vertigo        History reviewed  No pertinent surgical history  History reviewed  No pertinent family history  I have reviewed and agree with the history as documented  E-Cigarette/Vaping    E-Cigarette Use Never User      E-Cigarette/Vaping Substances     Social History     Tobacco Use    Smoking status: Never Smoker    Smokeless tobacco: Never Used   Substance Use Topics    Alcohol use: Yes     Frequency: 2-3 times a week    Drug use: Never       Review of Systems   Constitutional: Negative for activity change, appetite change, chills, fatigue and fever  HENT: Negative for congestion, ear pain, rhinorrhea and sore throat  Eyes: Negative for discharge, redness and visual disturbance  Respiratory: Negative for cough, chest tightness, shortness of breath and wheezing  Cardiovascular: Negative for chest pain and palpitations  Gastrointestinal: Negative for abdominal pain, constipation, diarrhea, nausea and vomiting  Endocrine: Negative for polydipsia and polyuria  Genitourinary: Negative for difficulty urinating, dysuria, frequency, hematuria and urgency  Musculoskeletal: Negative for arthralgias and myalgias  Skin: Negative for color change, pallor and rash  Neurological: Positive for dizziness and light-headedness  Negative for weakness, numbness and headaches  Hematological: Negative for adenopathy  Does not bruise/bleed easily  Psychiatric/Behavioral: The patient is nervous/anxious  All other systems reviewed and are negative  Physical Exam  Physical Exam  Vitals signs and nursing note reviewed  Constitutional:       Appearance: He is well-developed  HENT:      Head: Normocephalic and atraumatic        Right Ear: External ear normal       Left Ear: External ear normal       Nose: Nose normal    Eyes: Conjunctiva/sclera: Conjunctivae normal       Pupils: Pupils are equal, round, and reactive to light  Neck:      Musculoskeletal: Normal range of motion and neck supple  Cardiovascular:      Rate and Rhythm: Normal rate and regular rhythm  Heart sounds: Normal heart sounds  Pulmonary:      Effort: Pulmonary effort is normal  No respiratory distress  Breath sounds: Normal breath sounds  No wheezing or rales  Chest:      Chest wall: No tenderness  Abdominal:      General: Bowel sounds are normal  There is no distension  Palpations: Abdomen is soft  Tenderness: There is no abdominal tenderness  There is no guarding  Musculoskeletal: Normal range of motion  Skin:     General: Skin is warm and dry  Neurological:      Mental Status: He is alert and oriented to person, place, and time  Cranial Nerves: No cranial nerve deficit  Sensory: No sensory deficit           Vital Signs  ED Triage Vitals [08/26/20 0741]   Temperature Pulse Respirations Blood Pressure SpO2   97 6 °F (36 4 °C) 90 20 (!) 201/93 97 %      Temp Source Heart Rate Source Patient Position - Orthostatic VS BP Location FiO2 (%)   Temporal Monitor Lying Right arm --      Pain Score       --           Vitals:    08/26/20 0800 08/26/20 0815 08/26/20 0830 08/26/20 0845   BP: 166/98 146/78 132/70 131/73   Pulse: 87 89 87 83   Patient Position - Orthostatic VS:             Visual Acuity  Visual Acuity      Most Recent Value   L Pupil Size (mm)  3   R Pupil Size (mm)  3          ED Medications  Medications   sodium chloride 0 9 % bolus 1,000 mL (1,000 mL Intravenous New Bag 8/26/20 0803)   diazepam (VALIUM) injection 5 mg (5 mg Intravenous Given 8/26/20 0803)   meclizine (ANTIVERT) tablet 25 mg (25 mg Oral Given 8/26/20 0803)   cloNIDine (CATAPRES) tablet 0 2 mg (0 2 mg Oral Given 8/26/20 0804)       Diagnostic Studies  Results Reviewed     Procedure Component Value Units Date/Time    Troponin I [[id number]]  (Normal) Collected:  08/26/20 0801    Lab Status:  Final result Specimen:  Blood from Line, Venous Updated:  08/26/20 0850     Troponin I <0 02 ng/mL     NT-BNP PRO [[id number]]  (Abnormal) Collected:  08/26/20 0801    Lab Status:  Final result Specimen:  Blood from Line, Venous Updated:  08/26/20 0845     NT-proBNP 128 pg/mL     Comprehensive metabolic panel [[id number]]  (Abnormal) Collected:  08/26/20 0801    Lab Status:  Final result Specimen:  Blood from Line, Venous Updated:  08/26/20 0843     Sodium 137 mmol/L      Potassium 4 1 mmol/L      Chloride 103 mmol/L      CO2 26 mmol/L      ANION GAP 8 mmol/L      BUN 17 mg/dL      Creatinine 1 37 mg/dL      Glucose 138 mg/dL      Calcium 8 6 mg/dL      AST 17 U/L      ALT 30 U/L      Alkaline Phosphatase 33 U/L      Total Protein 7 9 g/dL      Albumin 3 3 g/dL      Total Bilirubin 0 45 mg/dL      eGFR 59 ml/min/1 73sq m     Narrative:       [facility] guidelines for Chronic Kidney Disease (CKD):     Stage 1 with normal or high GFR (GFR > 90 mL/min/1 73 square meters)    Stage 2 Mild CKD (GFR = 60-89 mL/min/1 73 square meters)    Stage 3A Moderate CKD (GFR = 45-59 mL/min/1 73 square meters)    Stage 3B Moderate CKD (GFR = 30-44 mL/min/1 73 square meters)    Stage 4 Severe CKD (GFR = 15-29 mL/min/1 73 square meters)    Stage 5 End Stage CKD (GFR <15 mL/min/1 73 square meters)  Note: GFR calculation is accurate only with a steady state creatinine    Protime-INR [[id number]]  (Normal) Collected:  08/26/20 0801    Lab Status:  Final result Specimen:  Blood from Line, Venous Updated:  08/26/20 0828     Protime 12 6 seconds      INR 0 96    APTT [[id number]]  (Normal) Collected:  08/26/20 0801    Lab Status:  Final result Specimen:  Blood from Line, Venous Updated:  08/26/20 0828     PTT 28 seconds     CBC and differential [[id number]]  (Abnormal) Collected:  08/26/20 0801    Lab Status:  Final result Specimen:  Blood from Line, Venous Updated:  08/26/20 0815     WBC 8 05 Thousand/uL      RBC 5 31 Million/uL      Hemoglobin 14 9 g/dL      Hematocrit 46 1 %      MCV 87 fL      MCH 28 1 pg      MCHC 32 3 g/dL      RDW 14 6 %      MPV 8 9 fL      Platelets 237 Thousands/uL      nRBC 0 /100 WBCs      Neutrophils Relative 71 %      Immat GRANS % 1 %      Lymphocytes Relative 10 %      Monocytes Relative 13 %      Eosinophils Relative 4 %      Basophils Relative 1 %      Neutrophils Absolute 5 79 Thousands/µL      Immature Grans Absolute 0 08 Thousand/uL      Lymphocytes Absolute 0 77 Thousands/µL      Monocytes Absolute 1 08 Thousand/µL      Eosinophils Absolute 0 29 Thousand/µL      Basophils Absolute 0 04 Thousands/µL                  No orders to display              Procedures  ECG 12 Lead Documentation Only    Date/Time: 8/26/2020 8:01 AM  Performed by: Brad De La Paz DO  Authorized by: Brad De La Paz DO     ECG reviewed by me, the ED Provider: yes    Patient location:  ED  Previous ECG:     Comparison to cardiac monitor: Yes    Rate:     ECG rate:  86    ECG rate assessment: normal    Rhythm:     Rhythm: sinus rhythm    QRS:     QRS axis:  Normal    QRS intervals:  Normal  Conduction:     Conduction: normal    ST segments:     ST segments:  Non-specific  T waves:     T waves: non-specific               ED Course  ED Course as of Aug 26 0911   Wed Aug 26, 2020   0831 BP now 132/70 patient calm comfortable and feeling improved resting         6313 Patient feels much improved in the ED blood pressure remains stable and well controlled now 908 systolic and he feels well the dizziness is resolved I suspect poor blood pressure control may be contributing to this he also elicits a dry cough complaint related to the lisinopril I discussed with him the option of discontinuing lisinopril and starting a new antihypertensive medication which may be a very good option for him he will discuss this further with his PCP at visit which is already scheduled today    Advised rest and supportive care for now and continue all antihypertensive medications and home medications and follow up promptly with PCP as scheduled today return precautions and anticipatory guidance discussed  US AUDIT      Most Recent Value   Initial Alcohol Screen: US AUDIT-C    1  How often do you have a drink containing alcohol? 2 Filed at: 08/26/2020 0743   2  How many drinks containing alcohol do you have on a typical day you are drinking? 1 Filed at: 08/26/2020 0743   3a  Male UNDER 65: How often do you have five or more drinks on one occasion? 0 Filed at: 08/26/2020 0743   3b  FEMALE Any Age, or MALE 65+: How often do you have 4 or more drinks on one occassion? 0 Filed at: 08/26/2020 0743   Audit-C Score  3 Filed at: 08/26/2020 0570                  MIGNON/DAST-10      Most Recent Value   How many times in the past year have you    Used an illegal drug or used a prescription medication for non-medical reasons?   Never Filed at: 08/26/2020 3288                                MDM  Number of Diagnoses or Management Options  CKD (chronic kidney disease): new and requires workup  Dizziness: new and requires workup  HTN (hypertension): new and requires workup     Amount and/or Complexity of Data Reviewed  Clinical lab tests: ordered and reviewed  Tests in the radiology section of CPT®: ordered and reviewed  Tests in the medicine section of CPT®: ordered and reviewed  Decide to obtain previous medical records or to obtain history from someone other than the patient: yes  Review and summarize past medical records: yes  Independent visualization of images, tracings, or specimens: yes    Risk of Complications, Morbidity, and/or Mortality  Presenting problems: moderate  Diagnostic procedures: moderate  Management options: moderate    Patient Progress  Patient progress: stable        Disposition  Final diagnoses:   Dizziness   HTN (hypertension)   CKD (chronic kidney disease)     Time reflects when diagnosis was documented in both MDM as applicable and the Disposition within this note     Time User Action Codes Description Comment    8/26/2020  8:48 AM Courtney King Add [R42] Dizziness     8/26/2020  8:48 AM Argenis Nugent Add [I10] HTN (hypertension)     8/26/2020  8:48 AM Courtney King Add [N18 9] CKD (chronic kidney disease)       ED Disposition     ED Disposition Condition Date/Time Comment    Discharge Stable Wed Aug 26, 2020  9:08 AM Jeff Miguel discharge to home/self care  Follow-up Information     Follow up With Specialties Details Why Adrienne White MD Internal Medicine Go today  [address] [place] [address]  [phone]            Patient's Medications   Discharge Prescriptions    No medications on file     No discharge procedures on file      PDMP Review     None          ED Provider  Electronically Signed by           Alexander Galvez DO  08/26/20 5980

## 2020-08-26 NOTE — ED NOTES
Pt denies dizziness at this time, states "just pain in my left ear", states was out walking for about 1 5hrs PTA and had no dizziness       Prem Mustafa RN  08/26/20 7878

## 2021-05-12 ENCOUNTER — APPOINTMENT (INPATIENT)
Dept: RADIOLOGY | Facility: HOSPITAL | Age: 56
End: 2021-05-12
Payer: COMMERCIAL

## 2021-05-12 ENCOUNTER — HOSPITAL ENCOUNTER (EMERGENCY)
Facility: HOSPITAL | Age: 56
Discharge: HOME | End: 2021-05-12
Attending: EMERGENCY MEDICINE | Admitting: EMERGENCY MEDICINE
Payer: COMMERCIAL

## 2021-05-12 VITALS
TEMPERATURE: 98.2 F | RESPIRATION RATE: 18 BRPM | HEIGHT: 66 IN | BODY MASS INDEX: 50.62 KG/M2 | SYSTOLIC BLOOD PRESSURE: 142 MMHG | HEART RATE: 86 BPM | DIASTOLIC BLOOD PRESSURE: 69 MMHG | WEIGHT: 315 LBS | OXYGEN SATURATION: 94 %

## 2021-05-12 DIAGNOSIS — J06.9 URI (UPPER RESPIRATORY INFECTION): Primary | ICD-10-CM

## 2021-05-12 LAB
ALBUMIN SERPL BCP-MCNC: 3.3 G/DL (ref 3.5–5)
ALP SERPL-CCNC: 57 U/L (ref 46–116)
ALT SERPL W P-5'-P-CCNC: 34 U/L (ref 12–78)
ANION GAP SERPL CALCULATED.3IONS-SCNC: 5 MMOL/L (ref 4–13)
AST SERPL W P-5'-P-CCNC: 22 U/L (ref 5–45)
BASOPHILS # BLD AUTO: 0.09 THOUSANDS/ΜL (ref 0–0.1)
BASOPHILS NFR BLD AUTO: 1 % (ref 0–1)
BILIRUB SERPL-MCNC: 0.18 MG/DL (ref 0.2–1)
BUN SERPL-MCNC: 21 MG/DL (ref 5–25)
CALCIUM ALBUM COR SERPL-MCNC: 9.6 MG/DL (ref 8.3–10.1)
CALCIUM SERPL-MCNC: 9 MG/DL (ref 8.3–10.1)
CHLORIDE SERPL-SCNC: 104 MMOL/L (ref 100–108)
CO2 SERPL-SCNC: 29 MMOL/L (ref 21–32)
CREAT SERPL-MCNC: 1.34 MG/DL (ref 0.6–1.3)
D DIMER PPP FEU-MCNC: 0.39 UG/ML FEU
EOSINOPHIL # BLD AUTO: 0.6 THOUSAND/ΜL (ref 0–0.61)
EOSINOPHIL NFR BLD AUTO: 5 % (ref 0–6)
ERYTHROCYTE [DISTWIDTH] IN BLOOD BY AUTOMATED COUNT: 14.6 % (ref 11.6–15.1)
GFR SERPL CREATININE-BSD FRML MDRD: 60 ML/MIN/1.73SQ M
GLUCOSE SERPL-MCNC: 180 MG/DL (ref 65–140)
HCT VFR BLD AUTO: 47.1 % (ref 36.5–49.3)
HGB BLD-MCNC: 15 G/DL (ref 12–17)
IMM GRANULOCYTES # BLD AUTO: 0.19 THOUSAND/UL (ref 0–0.2)
IMM GRANULOCYTES NFR BLD AUTO: 2 % (ref 0–2)
LYMPHOCYTES # BLD AUTO: 1.36 THOUSANDS/ΜL (ref 0.6–4.47)
LYMPHOCYTES NFR BLD AUTO: 12 % (ref 14–44)
MCH RBC QN AUTO: 28.6 PG (ref 26.8–34.3)
MCHC RBC AUTO-ENTMCNC: 31.8 G/DL (ref 31.4–37.4)
MCV RBC AUTO: 90 FL (ref 82–98)
MONOCYTES # BLD AUTO: 1.12 THOUSAND/ΜL (ref 0.17–1.22)
MONOCYTES NFR BLD AUTO: 10 % (ref 4–12)
NEUTROPHILS # BLD AUTO: 7.78 THOUSANDS/ΜL (ref 1.85–7.62)
NEUTS SEG NFR BLD AUTO: 70 % (ref 43–75)
NRBC BLD AUTO-RTO: 0 /100 WBCS
NT-PROBNP SERPL-MCNC: 48 PG/ML
PLATELET # BLD AUTO: 312 THOUSANDS/UL (ref 149–390)
PMV BLD AUTO: 9.4 FL (ref 8.9–12.7)
POTASSIUM SERPL-SCNC: 4.7 MMOL/L (ref 3.5–5.3)
PROT SERPL-MCNC: 7.5 G/DL (ref 6.4–8.2)
RBC # BLD AUTO: 5.24 MILLION/UL (ref 3.88–5.62)
SARS-COV-2 RNA RESP QL NAA+PROBE: NEGATIVE
SODIUM SERPL-SCNC: 138 MMOL/L (ref 136–145)
TROPONIN I SERPL-MCNC: <0.02 NG/ML
WBC # BLD AUTO: 11.14 THOUSAND/UL (ref 4.31–10.16)

## 2021-05-12 PROCEDURE — U0005 INFEC AGEN DETEC AMPLI PROBE: HCPCS | Performed by: EMERGENCY MEDICINE

## 2021-05-12 PROCEDURE — 36415 COLL VENOUS BLD VENIPUNCTURE: CPT | Performed by: EMERGENCY MEDICINE

## 2021-05-12 PROCEDURE — 85025 COMPLETE CBC W/AUTO DIFF WBC: CPT | Performed by: EMERGENCY MEDICINE

## 2021-05-12 PROCEDURE — 99285 EMERGENCY DEPT VISIT HI MDM: CPT

## 2021-05-12 PROCEDURE — 93005 ELECTROCARDIOGRAM TRACING: CPT

## 2021-05-12 PROCEDURE — 83880 ASSAY OF NATRIURETIC PEPTIDE: CPT | Performed by: EMERGENCY MEDICINE

## 2021-05-12 PROCEDURE — 84484 ASSAY OF TROPONIN QUANT: CPT | Performed by: EMERGENCY MEDICINE

## 2021-05-12 PROCEDURE — 99282 EMERGENCY DEPT VISIT SF MDM: CPT | Performed by: EMERGENCY MEDICINE

## 2021-05-12 PROCEDURE — 80053 COMPREHEN METABOLIC PANEL: CPT | Performed by: EMERGENCY MEDICINE

## 2021-05-12 PROCEDURE — 71045 X-RAY EXAM CHEST 1 VIEW: CPT

## 2021-05-12 PROCEDURE — U0003 INFECTIOUS AGENT DETECTION BY NUCLEIC ACID (DNA OR RNA); SEVERE ACUTE RESPIRATORY SYNDROME CORONAVIRUS 2 (SARS-COV-2) (CORONAVIRUS DISEASE [COVID-19]), AMPLIFIED PROBE TECHNIQUE, MAKING USE OF HIGH THROUGHPUT TECHNOLOGIES AS DESCRIBED BY CMS-2020-01-R: HCPCS | Performed by: EMERGENCY MEDICINE

## 2021-05-12 PROCEDURE — 85379 FIBRIN DEGRADATION QUANT: CPT | Performed by: EMERGENCY MEDICINE

## 2021-05-12 RX ORDER — AMLODIPINE BESYLATE AND ATORVASTATIN CALCIUM 10; 10 MG/1; MG/1
1 TABLET, FILM COATED ORAL DAILY
COMMUNITY

## 2021-05-12 RX ORDER — IRBESARTAN 300 MG/1
300 TABLET ORAL
COMMUNITY

## 2021-05-12 NOTE — Clinical Note
Sola Omalley was seen and treated in our emergency department on 5/12/2021  Diagnosis:     Cinthya Wong  may return to work on return date  He may return on this date: 05/14/2021         If you have any questions or concerns, please don't hesitate to call        Danny Riddle DO    ______________________________           _______________          _______________  Hospital Representative                              Date                                Time

## 2021-05-12 NOTE — ED PROVIDER NOTES
History  Chief Complaint   Patient presents with    Shortness of Breath     Pt SOB sincelast week  Worse today  nasal congestion     50year-old male complains of sinus congestion and productive cough for the past week  No fever  No loss of taste or loss of smell  He denies chest pain and exertional symptoms  No history of CAD, VTE or CVA      History provided by:  Patient  Shortness of Breath  Severity:  Mild  Onset quality:  Gradual  Timing:  Intermittent  Progression:  Waxing and waning  Chronicity:  New  Context: URI    Ineffective treatments:  None tried  Associated symptoms: no abdominal pain and no hemoptysis    Risk factors: no hx of PE/DVT and no tobacco use        Prior to Admission Medications   Prescriptions Last Dose Informant Patient Reported? Taking? Levothyroxine Sodium (SYNTHROID PO) 5/12/2021 at Unknown time  Yes Yes   Sig: Take 1 75 mg by mouth    amLODIPine-atorvastatin (CADUET) 10-10 MG per tablet 5/12/2021 at Unknown time  Yes Yes   Sig: Take 1 tablet by mouth daily   hydrochlorothiazide (HYDRODIURIL) 25 mg tablet 5/12/2021 at Unknown time  Yes Yes   Sig: Take 25 mg by mouth daily   irbesartan (AVAPRO) 300 mg tablet 5/12/2021 at Unknown time  Yes Yes   Sig: Take 300 mg by mouth daily at bedtime   metFORMIN (GLUCOPHAGE) 500 mg tablet 5/12/2021 at Unknown time  Yes Yes   Sig: Take 500 mg by mouth 2 (two) times a day with meals      Facility-Administered Medications: None       Past Medical History:   Diagnosis Date    Disease of thyroid gland     Hypertension     Vertigo        No past surgical history on file  No family history on file  I have reviewed and agree with the history as documented      E-Cigarette/Vaping    E-Cigarette Use Never User      E-Cigarette/Vaping Substances     Social History     Tobacco Use    Smoking status: Never Smoker    Smokeless tobacco: Never Used   Substance Use Topics    Alcohol use: Yes     Frequency: 2-3 times a week    Drug use: Never Review of Systems   Respiratory: Positive for shortness of breath  Negative for hemoptysis  Gastrointestinal: Negative for abdominal pain  All other systems reviewed and are negative  Physical Exam  Physical Exam  Vitals signs and nursing note reviewed  Constitutional:       Comments: Pleasant, comfortable-appearing   HENT:      Head: Normocephalic and atraumatic  Mouth/Throat:      Comments: Mild general pharyngeal erythema  Eyes:      Conjunctiva/sclera: Conjunctivae normal       Pupils: Pupils are equal, round, and reactive to light  Neck:      Musculoskeletal: Neck supple  Cardiovascular:      Rate and Rhythm: Normal rate and regular rhythm  Heart sounds: Normal heart sounds  Pulmonary:      Effort: Pulmonary effort is normal       Breath sounds: Normal breath sounds  Abdominal:      General: Bowel sounds are normal  There is no distension  Palpations: Abdomen is soft  Tenderness: There is no abdominal tenderness  Musculoskeletal: Normal range of motion  Right lower leg: He exhibits no tenderness  Edema present  Left lower leg: He exhibits no tenderness  Edema present  Skin:     General: Skin is warm and dry  Neurological:      Mental Status: He is alert and oriented to person, place, and time  Cranial Nerves: No cranial nerve deficit  Coordination: Coordination normal    Psychiatric:         Behavior: Behavior normal          Thought Content:  Thought content normal          Judgment: Judgment normal          Vital Signs  ED Triage Vitals [05/12/21 1356]   Temperature Pulse Respirations Blood Pressure SpO2   98 2 °F (36 8 °C) 97 18 140/90 97 %      Temp Source Heart Rate Source Patient Position - Orthostatic VS BP Location FiO2 (%)   Temporal Monitor Sitting Right arm --      Pain Score       No Pain           Vitals:    05/12/21 1356 05/12/21 1400   BP: 140/90 140/90   Pulse: 97 95   Patient Position - Orthostatic VS: Sitting Visual Acuity      ED Medications  Medications - No data to display    Diagnostic Studies  Results Reviewed     Procedure Component Value Units Date/Time    Novel Coronavirus [facility] - 2 Hour Stat [[id number]]  (Normal) Collected: 05/12/21 1428    Lab Status: Final result Specimen: Nares from Nasopharyngeal Swab Updated: 05/12/21 1526     SARS-CoV-2 Negative    Narrative: The specimen collection materials, transport medium, and/or testing methodology utilized in the production of these test results have been proven to be reliable in a limited validation with an abbreviated program under the Emergency Utilization Authorization provided by the FDA  Testing reported as "Presumptive positive" will be confirmed with secondary testing to ensure result accuracy  Clinical caution and judgement should be used with the interpretation of these results with consideration of the clinical impression and other laboratory testing  Testing reported as "Positive" or "Negative" has been proven to be accurate according to standard laboratory validation requirements  All testing is performed with control materials showing appropriate reactivity at standard intervals        NT-BNP PRO [[id number]]  (Normal) Collected: 05/12/21 1438    Lab Status: Final result Specimen: Blood from Arm, Left Updated: 05/12/21 1503     NT-proBNP 48 pg/mL     Comprehensive metabolic panel [[id number]]  (Abnormal) Collected: 05/12/21 1438    Lab Status: Final result Specimen: Blood from Arm, Left Updated: 05/12/21 1503     Sodium 138 mmol/L      Potassium 4 7 mmol/L      Chloride 104 mmol/L      CO2 29 mmol/L      ANION GAP 5 mmol/L      BUN 21 mg/dL      Creatinine 1 34 mg/dL      Glucose 180 mg/dL      Calcium 9 0 mg/dL      Corrected Calcium 9 6 mg/dL      AST 22 U/L      ALT 34 U/L      Alkaline Phosphatase 57 U/L      Total Protein 7 5 g/dL      Albumin 3 3 g/dL      Total Bilirubin 0 18 mg/dL      eGFR 60 ml/min/1 73sq m Narrative:      National Kidney Disease Foundation guidelines for Chronic Kidney Disease (CKD):     Stage 1 with normal or high GFR (GFR > 90 mL/min/1 73 square meters)    Stage 2 Mild CKD (GFR = 60-89 mL/min/1 73 square meters)    Stage 3A Moderate CKD (GFR = 45-59 mL/min/1 73 square meters)    Stage 3B Moderate CKD (GFR = 30-44 mL/min/1 73 square meters)    Stage 4 Severe CKD (GFR = 15-29 mL/min/1 73 square meters)    Stage 5 End Stage CKD (GFR <15 mL/min/1 73 square meters)  Note: GFR calculation is accurate only with a steady state creatinine    Troponin I [[id number]]  (Normal) Collected: 05/12/21 1438    Lab Status: Final result Specimen: Blood from Arm, Left Updated: 05/12/21 1502     Troponin I <0 02 ng/mL     D-Dimer [[id number]]  (Normal) Collected: 05/12/21 1438    Lab Status: Final result Specimen: Blood from Arm, Left Updated: 05/12/21 1455     D-Dimer, Quant 0 39 ug/ml FEU     CBC and differential [[id number]]  (Abnormal) Collected: 05/12/21 1438    Lab Status: Final result Specimen: Blood from Arm, Left Updated: 05/12/21 1444     WBC 11 14 Thousand/uL      RBC 5 24 Million/uL      Hemoglobin 15 0 g/dL      Hematocrit 47 1 %      MCV 90 fL      MCH 28 6 pg      MCHC 31 8 g/dL      RDW 14 6 %      MPV 9 4 fL      Platelets 556 Thousands/uL      nRBC 0 /100 WBCs      Neutrophils Relative 70 %      Immat GRANS % 2 %      Lymphocytes Relative 12 %      Monocytes Relative 10 %      Eosinophils Relative 5 %      Basophils Relative 1 %      Neutrophils Absolute 7 78 Thousands/µL      Immature Grans Absolute 0 19 Thousand/uL      Lymphocytes Absolute 1 36 Thousands/µL      Monocytes Absolute 1 12 Thousand/µL      Eosinophils Absolute 0 60 Thousand/µL      Basophils Absolute 0 09 Thousands/µL                  XR chest 1 view portable   Final Result by Pro Bai DO (05/12 1522)      No acute cardiopulmonary disease   In the setting of clinically suspected/proven COVID-19, this plain film appearance does not contain findings that raise concern for viral pneumonia such as COVID-19, but does not rule out this diagnosis  Workstation performed: [id number]                    Procedures  Procedures         ED Course  ED Course as of May 12 1545   Wed May 12, 2021   1458 EKG 2:45 p m  Normal sinus rhythm rate 70 normal axis intervals no ST elevation or depression interpreted by me      1514 NT-proBNP: 48   1514 Troponin I: <0 02   1514 Creatinine(!): 1 34   1515 WBC(!): 11 14   1537 SARS-COV-2: Negative   1539 We discussed results agreeable withc close outpatient follow-up, will return immediately if worse or any symptoms, notes steamy mist and long showers help                HEART Risk Score      Most Recent Value   Heart Score Risk Calculator   History  0 Filed at: 05/12/2021 1538   ECG  0 Filed at: 05/12/2021 1538   Age  1 Filed at: 05/12/2021 1538   Risk Factors  2 Filed at: 05/12/2021 1538   Troponin  0 Filed at: 05/12/2021 1538   HEART Score  3 Filed at: 05/12/2021 1538                      SBIRT 20yo+      Most Recent Value   SBIRT (25 yo +)   In order to provide better care to our patients, we are screening all of our patients for alcohol and drug use  Would it be okay to ask you these screening questions? Yes Filed at: 05/12/2021 1402   Initial Alcohol Screen: US AUDIT-C    1  How often do you have a drink containing alcohol? 4 Filed at: 05/12/2021 1402   2  How many drinks containing alcohol do you have on a typical day you are drinking? 1 Filed at: 05/12/2021 1402   3a  Male UNDER 65: How often do you have five or more drinks on one occasion? 0 Filed at: 05/12/2021 1402   Audit-C Score  5 Filed at: 05/12/2021 1402   MIGNON: How many times in the past year have you    Used an illegal drug or used a prescription medication for non-medical reasons?   Never Filed at: 05/12/2021 1402                    MDM    Disposition  Final diagnoses:   URI (upper respiratory infection) Time reflects when diagnosis was documented in both MDM as applicable and the Disposition within this note     Time User Action Codes Description Comment    5/12/2021  2:09 PM Santana Loud Add [R19 7] Diarrhea     5/12/2021  2:10 PM [place] Loud Add [A41 9] Sepsis ([facility] [address] )     5/12/2021  2:14 PM Santana Loud Modify [A41 9] Sepsis ([facility] [address] )     5/12/2021  2:14 PM [place] Loud Remove [R19 7] Diarrhea     5/12/2021  2:14 PM [place] Hilario Pk Mize Remove [A41 9] Sepsis ([facility] [address] )     5/12/2021  3:37 PM [place] Loud Add [J06 9] URI (upper respiratory infection)       ED Disposition     ED Disposition Condition Date/Time Comment    Discharge Stable Wed May 12, 2021  3:37 PM Corry Dunn discharge to home/self care  Follow-up Information     Follow up With Specialties Details Why Sona Vu MD Internal Medicine Schedule an appointment as soon as possible for a visit in 1 week  [address] [place] [address]  [phone]            Patient's Medications   Discharge Prescriptions    No medications on file     No discharge procedures on file      PDMP Review     None          ED Provider  Electronically Signed by           Yannick Rangel DO  05/12/21 3354 [facility]   05/12/21 1017

## 2021-05-12 NOTE — Clinical Note
Case was discussed with Jhony and the patient's admission status was agreed to be Admission Status: inpatient status to the service of Dr Portia Simeon

## 2021-05-16 LAB
ATRIAL RATE: 78 BPM
P AXIS: 55 DEGREES
PR INTERVAL: 132 MS
QRS AXIS: 83 DEGREES
QRSD INTERVAL: 86 MS
QT INTERVAL: 370 MS
QTC INTERVAL: 421 MS
T WAVE AXIS: 27 DEGREES
VENTRICULAR RATE: 78 BPM

## 2021-05-16 PROCEDURE — 93010 ELECTROCARDIOGRAM REPORT: CPT | Performed by: INTERNAL MEDICINE

## 2023-12-20 ENCOUNTER — APPOINTMENT (EMERGENCY)
Dept: CT IMAGING | Facility: HOSPITAL | Age: 58
End: 2023-12-20

## 2023-12-20 ENCOUNTER — HOSPITAL ENCOUNTER (EMERGENCY)
Facility: HOSPITAL | Age: 58
Discharge: HOME | End: 2023-12-20
Attending: EMERGENCY MEDICINE | Admitting: EMERGENCY MEDICINE
Payer: COMMERCIAL

## 2023-12-20 ENCOUNTER — APPOINTMENT (EMERGENCY)
Dept: RADIOLOGY | Facility: HOSPITAL | Age: 58
End: 2023-12-20

## 2023-12-20 VITALS
HEIGHT: 66 IN | OXYGEN SATURATION: 96 % | SYSTOLIC BLOOD PRESSURE: 149 MMHG | TEMPERATURE: 97.7 F | RESPIRATION RATE: 17 BRPM | WEIGHT: 274.03 LBS | HEART RATE: 63 BPM | DIASTOLIC BLOOD PRESSURE: 90 MMHG | BODY MASS INDEX: 44.04 KG/M2

## 2023-12-20 DIAGNOSIS — R42 VERTIGO: ICD-10-CM

## 2023-12-20 DIAGNOSIS — R07.9 CHEST PAIN: Primary | ICD-10-CM

## 2023-12-20 DIAGNOSIS — I10 HTN (HYPERTENSION): ICD-10-CM

## 2023-12-20 DIAGNOSIS — E03.9 HYPOTHYROIDISM: ICD-10-CM

## 2023-12-20 DIAGNOSIS — E11.9 DIABETES (HCC): ICD-10-CM

## 2023-12-20 LAB
2HR DELTA HS TROPONIN: -1 NG/L
ALBUMIN SERPL BCP-MCNC: 4 G/DL (ref 3.5–5)
ALP SERPL-CCNC: 35 U/L (ref 34–104)
ALT SERPL W P-5'-P-CCNC: 20 U/L (ref 7–52)
ANION GAP SERPL CALCULATED.3IONS-SCNC: 8 MMOL/L
APTT PPP: 27 SECONDS (ref 23–37)
AST SERPL W P-5'-P-CCNC: 17 U/L (ref 13–39)
ATRIAL RATE: 59 BPM
ATRIAL RATE: 74 BPM
BASOPHILS # BLD AUTO: 0.04 THOUSANDS/ÂΜL (ref 0–0.1)
BASOPHILS NFR BLD AUTO: 1 % (ref 0–1)
BILIRUB SERPL-MCNC: 0.54 MG/DL (ref 0.2–1)
BNP SERPL-MCNC: 10 PG/ML (ref 0–100)
BUN SERPL-MCNC: 17 MG/DL (ref 5–25)
CALCIUM SERPL-MCNC: 8.7 MG/DL (ref 8.4–10.2)
CARDIAC TROPONIN I PNL SERPL HS: 5 NG/L
CARDIAC TROPONIN I PNL SERPL HS: 6 NG/L
CHLORIDE SERPL-SCNC: 105 MMOL/L (ref 96–108)
CO2 SERPL-SCNC: 23 MMOL/L (ref 21–32)
CREAT SERPL-MCNC: 1 MG/DL (ref 0.6–1.3)
EOSINOPHIL # BLD AUTO: 0.1 THOUSAND/ÂΜL (ref 0–0.61)
EOSINOPHIL NFR BLD AUTO: 2 % (ref 0–6)
ERYTHROCYTE [DISTWIDTH] IN BLOOD BY AUTOMATED COUNT: 12.9 % (ref 11.6–15.1)
GFR SERPL CREATININE-BSD FRML MDRD: 84 ML/MIN/1.73SQ M
GLUCOSE SERPL-MCNC: 182 MG/DL (ref 65–140)
GLUCOSE SERPL-MCNC: 197 MG/DL (ref 65–140)
HCT VFR BLD AUTO: 47.9 % (ref 36.5–49.3)
HGB BLD-MCNC: 15.8 G/DL (ref 12–17)
IMM GRANULOCYTES # BLD AUTO: 0.04 THOUSAND/UL (ref 0–0.2)
IMM GRANULOCYTES NFR BLD AUTO: 1 % (ref 0–2)
INR PPP: 0.86 (ref 0.84–1.19)
LYMPHOCYTES # BLD AUTO: 1.29 THOUSANDS/ÂΜL (ref 0.6–4.47)
LYMPHOCYTES NFR BLD AUTO: 23 % (ref 14–44)
MAGNESIUM SERPL-MCNC: 1.9 MG/DL (ref 1.9–2.7)
MCH RBC QN AUTO: 29.4 PG (ref 26.8–34.3)
MCHC RBC AUTO-ENTMCNC: 33 G/DL (ref 31.4–37.4)
MCV RBC AUTO: 89 FL (ref 82–98)
MONOCYTES # BLD AUTO: 1.06 THOUSAND/ÂΜL (ref 0.17–1.22)
MONOCYTES NFR BLD AUTO: 19 % (ref 4–12)
NEUTROPHILS # BLD AUTO: 3.19 THOUSANDS/ÂΜL (ref 1.85–7.62)
NEUTS SEG NFR BLD AUTO: 54 % (ref 43–75)
NRBC BLD AUTO-RTO: 0 /100 WBCS
P AXIS: 14 DEGREES
P AXIS: 27 DEGREES
PLATELET # BLD AUTO: 214 THOUSANDS/UL (ref 149–390)
PMV BLD AUTO: 9.9 FL (ref 8.9–12.7)
POTASSIUM SERPL-SCNC: 3.9 MMOL/L (ref 3.5–5.3)
PR INTERVAL: 136 MS
PR INTERVAL: 150 MS
PROT SERPL-MCNC: 7 G/DL (ref 6.4–8.4)
PROTHROMBIN TIME: 12.1 SECONDS (ref 11.6–14.5)
QRS AXIS: 18 DEGREES
QRS AXIS: 36 DEGREES
QRSD INTERVAL: 84 MS
QRSD INTERVAL: 86 MS
QT INTERVAL: 398 MS
QT INTERVAL: 432 MS
QTC INTERVAL: 427 MS
QTC INTERVAL: 441 MS
RBC # BLD AUTO: 5.37 MILLION/UL (ref 3.88–5.62)
SODIUM SERPL-SCNC: 136 MMOL/L (ref 135–147)
T WAVE AXIS: 17 DEGREES
T WAVE AXIS: 24 DEGREES
T4 FREE SERPL-MCNC: 0.66 NG/DL (ref 0.61–1.12)
TSH SERPL DL<=0.05 MIU/L-ACNC: 25.9 UIU/ML (ref 0.45–4.5)
VENTRICULAR RATE: 59 BPM
VENTRICULAR RATE: 74 BPM
WBC # BLD AUTO: 5.72 THOUSAND/UL (ref 4.31–10.16)

## 2023-12-20 PROCEDURE — 70450 CT HEAD/BRAIN W/O DYE: CPT

## 2023-12-20 PROCEDURE — 85730 THROMBOPLASTIN TIME PARTIAL: CPT | Performed by: EMERGENCY MEDICINE

## 2023-12-20 PROCEDURE — 85610 PROTHROMBIN TIME: CPT | Performed by: EMERGENCY MEDICINE

## 2023-12-20 PROCEDURE — 83880 ASSAY OF NATRIURETIC PEPTIDE: CPT | Performed by: EMERGENCY MEDICINE

## 2023-12-20 PROCEDURE — G1004 CDSM NDSC: HCPCS

## 2023-12-20 PROCEDURE — 93005 ELECTROCARDIOGRAM TRACING: CPT

## 2023-12-20 PROCEDURE — 84443 ASSAY THYROID STIM HORMONE: CPT | Performed by: EMERGENCY MEDICINE

## 2023-12-20 PROCEDURE — 96374 THER/PROPH/DIAG INJ IV PUSH: CPT

## 2023-12-20 PROCEDURE — 99285 EMERGENCY DEPT VISIT HI MDM: CPT

## 2023-12-20 PROCEDURE — 83735 ASSAY OF MAGNESIUM: CPT | Performed by: EMERGENCY MEDICINE

## 2023-12-20 PROCEDURE — 85025 COMPLETE CBC W/AUTO DIFF WBC: CPT | Performed by: EMERGENCY MEDICINE

## 2023-12-20 PROCEDURE — 36415 COLL VENOUS BLD VENIPUNCTURE: CPT | Performed by: EMERGENCY MEDICINE

## 2023-12-20 PROCEDURE — 84484 ASSAY OF TROPONIN QUANT: CPT | Performed by: EMERGENCY MEDICINE

## 2023-12-20 PROCEDURE — 96361 HYDRATE IV INFUSION ADD-ON: CPT

## 2023-12-20 PROCEDURE — 71045 X-RAY EXAM CHEST 1 VIEW: CPT

## 2023-12-20 PROCEDURE — 99285 EMERGENCY DEPT VISIT HI MDM: CPT | Performed by: EMERGENCY MEDICINE

## 2023-12-20 PROCEDURE — 84439 ASSAY OF FREE THYROXINE: CPT | Performed by: EMERGENCY MEDICINE

## 2023-12-20 PROCEDURE — 80053 COMPREHEN METABOLIC PANEL: CPT | Performed by: EMERGENCY MEDICINE

## 2023-12-20 PROCEDURE — 82948 REAGENT STRIP/BLOOD GLUCOSE: CPT

## 2023-12-20 RX ORDER — LEVOTHYROXINE SODIUM 0.2 MG/1
200 TABLET ORAL DAILY
Qty: 90 TABLET | Refills: 0 | Status: SHIPPED | OUTPATIENT
Start: 2023-12-20 | End: 2024-03-19

## 2023-12-20 RX ORDER — ASPIRIN 81 MG/1
324 TABLET, CHEWABLE ORAL ONCE
Status: COMPLETED | OUTPATIENT
Start: 2023-12-20 | End: 2023-12-20

## 2023-12-20 RX ORDER — DIPHENOXYLATE HYDROCHLORIDE AND ATROPINE SULFATE 2.5; .025 MG/1; MG/1
1 TABLET ORAL DAILY
COMMUNITY

## 2023-12-20 RX ORDER — NITROGLYCERIN 0.4 MG/1
0.4 TABLET SUBLINGUAL ONCE
Status: COMPLETED | OUTPATIENT
Start: 2023-12-20 | End: 2023-12-20

## 2023-12-20 RX ORDER — MECLIZINE HYDROCHLORIDE 25 MG/1
25 TABLET ORAL ONCE
Status: COMPLETED | OUTPATIENT
Start: 2023-12-20 | End: 2023-12-20

## 2023-12-20 RX ORDER — DIAZEPAM 5 MG/ML
5 INJECTION, SOLUTION INTRAMUSCULAR; INTRAVENOUS ONCE
Status: COMPLETED | OUTPATIENT
Start: 2023-12-20 | End: 2023-12-20

## 2023-12-20 RX ORDER — MECLIZINE HYDROCHLORIDE 25 MG/1
25 TABLET ORAL 3 TIMES DAILY PRN
Qty: 30 TABLET | Refills: 2 | Status: SHIPPED | OUTPATIENT
Start: 2023-12-20

## 2023-12-20 RX ORDER — AMLODIPINE BESYLATE 10 MG/1
10 TABLET ORAL DAILY
COMMUNITY
Start: 2023-08-25

## 2023-12-20 RX ADMIN — ASPIRIN 81 MG CHEWABLE TABLET 324 MG: 81 TABLET CHEWABLE at 05:42

## 2023-12-20 RX ADMIN — SODIUM CHLORIDE 1000 ML: 0.9 INJECTION, SOLUTION INTRAVENOUS at 05:52

## 2023-12-20 RX ADMIN — NITROGLYCERIN 0.4 MG: 0.4 TABLET SUBLINGUAL at 05:42

## 2023-12-20 RX ADMIN — DIAZEPAM 5 MG: 10 INJECTION, SOLUTION INTRAMUSCULAR; INTRAVENOUS at 05:42

## 2023-12-20 RX ADMIN — MECLIZINE HYDROCHLORIDE 25 MG: 25 TABLET ORAL at 07:05

## 2023-12-20 NOTE — ED CARE HANDOFF
Emergency Department Sign Out Note        Sign out and transfer of care from Dr. Zavala. See Separate Emergency Department note.     The patient, Bertin Minor, was evaluated by the previous provider for chest pain and dizziness.    Workup Completed:  Patient is a 57-year-old male with a history of hypertension, diabetes and vertigo who woke up complaining of a sharp tingling sensation in his left anterior chest.  Intermittently stabbing.  Reported feeling dizzy and lightheaded.  Also presented hypertensive.    ED Course / Workup Pending (followup):  History and physical  Review of records and studies as needed  Laboratory studies and imaging as appropriate  Resuscitative measures as required or needed, pain medications and comfort needs as appropriate  Re-evaluation as needed  Signed out to me    Patient with a nonischemic EKG.  Heart score is 3.  Pressure has been controlled.  Medicated for his chronic vertigo.  Will trend troponin and reevaluate.  Please see workup section of note for further information regarding disposition of this patient.    Patient presented to the emergency department and a MSE was performed. The patient was evaluated for complaint related to acute chest pain.  Patient is potentially at risk for, but not limited to, acute coronary syndrome, arrhythmia, myocardial infarction, pulmonary embolism, pneumothorax, infectious process of the lungs such as pneumonia, GERD, esophagitis, muscle strain, or costochondritis.  Several of these diagnoses have been evaluated and ruled out by history and physical.  As needed, patient will be further evaluated with laboratory and imaging studies.  Higher level diagnostics, such as CT imaging or ultrasound, may also be required.  Please see work-up portion of the note for further evaluation of patient's risk.  Socioeconomic factors were also considered as part of the decision-making process.  Unless otherwise stated in the chart or patient is admitted as  elsewhere documented, any previously prescribed medications will be maintained.        HEART Risk Score      Flowsheet Row Most Recent Value   Heart Score Risk Calculator    History 0 Filed at: 12/20/2023 0540   ECG 0 Filed at: 12/20/2023 0540   Age 1 Filed at: 12/20/2023 0540   Risk Factors 2 Filed at: 12/20/2023 0540   Troponin 0 Filed at: 12/20/2023 0540   HEART Score 3 Filed at: 12/20/2023 0540                 Procedures  Medical Decision Making  Amount and/or Complexity of Data Reviewed  Labs: ordered.  Radiology: ordered.    Risk  OTC drugs.  Prescription drug management.            Disposition  Final diagnoses:   Chest pain   HTN (hypertension)   Vertigo     Time reflects when diagnosis was documented in both MDM as applicable and the Disposition within this note       Time User Action Codes Description Comment    12/20/2023  5:59 AM Bravo Zavala Add [R07.9] Chest pain     12/20/2023  5:59 AM Bravo Zavala Add [I10] HTN (hypertension)     12/20/2023  5:59 AM Bravo Zavala [R42] Vertigo           ED Disposition       None          Follow-up Information    None       Patient's Medications   Discharge Prescriptions    No medications on file     No discharge procedures on file.       ED Provider  Electronically Signed by     Kar Victoria DO  12/20/23 0637

## 2023-12-20 NOTE — ED PROVIDER NOTES
History  Chief Complaint   Patient presents with    Chest Pain     Pt started with intermittent CP yesterday. States he woke up from his sleep tonight with pressure in the center of his chest that radiates into his left arm. Pt also reports becoming dizzy, sob, and diaphoretic     Patient is a 57-year-old male history of hypertension and vertigo and diabetes and thyroid disease presents to the emergency department complaining of chest pain which started as a tingling sensation in the left anterior chest yesterday this morning when he woke up had a sharp stabbing pain.  Patient also reports feeling dizzy and lightheaded and generally unwell this morning.  Reports history of vertigo with similar symptoms in the past and also has had similar chest pains and diagnosed with enlarged heart in the past.  No history of coronary artery disease.        History provided by:  Patient  Chest Pain  Pain location:  L chest  Pain quality: sharp    Pain quality: not stabbing    Pain radiates to:  Does not radiate  Pain radiates to the back: no    Pain severity:  Moderate  Onset quality:  Gradual  Duration:  1 day  Timing:  Intermittent  Progression:  Worsening  Chronicity:  Recurrent  Associated symptoms: dizziness, fatigue and nausea    Associated symptoms: no abdominal pain, no cough, no fever, no headache, no numbness, no palpitations, no shortness of breath, not vomiting and no weakness        Prior to Admission Medications   Prescriptions Last Dose Informant Patient Reported? Taking?   Levothyroxine Sodium (SYNTHROID PO)   Yes No   Sig: Take 1.75 mg by mouth    amLODIPine-atorvastatin (CADUET) 10-10 MG per tablet   Yes No   Sig: Take 1 tablet by mouth daily   hydrochlorothiazide (HYDRODIURIL) 25 mg tablet   Yes No   Sig: Take 25 mg by mouth daily   irbesartan (AVAPRO) 300 mg tablet   Yes No   Sig: Take 300 mg by mouth daily at bedtime   metFORMIN (GLUCOPHAGE) 500 mg tablet   Yes No   Sig: Take 500 mg by mouth 2 (two) times a  day with meals      Facility-Administered Medications: None       Past Medical History:   Diagnosis Date    Disease of thyroid gland     Hypertension     Vertigo        History reviewed. No pertinent surgical history.    History reviewed. No pertinent family history.  I have reviewed and agree with the history as documented.    E-Cigarette/Vaping    E-Cigarette Use Never User      E-Cigarette/Vaping Substances     Social History     Tobacco Use    Smoking status: Never    Smokeless tobacco: Never   Vaping Use    Vaping status: Never Used   Substance Use Topics    Alcohol use: Yes    Drug use: Never       Review of Systems   Constitutional:  Positive for fatigue. Negative for activity change, appetite change, chills and fever.   HENT:  Negative for congestion, ear pain, rhinorrhea and sore throat.    Eyes:  Negative for discharge, redness and visual disturbance.   Respiratory:  Positive for chest tightness. Negative for cough, shortness of breath and wheezing.    Cardiovascular:  Positive for chest pain. Negative for palpitations.   Gastrointestinal:  Positive for nausea. Negative for abdominal pain, constipation, diarrhea and vomiting.   Endocrine: Negative for polydipsia and polyuria.   Genitourinary:  Negative for difficulty urinating, dysuria, frequency, hematuria and urgency.   Musculoskeletal:  Negative for arthralgias and myalgias.   Skin:  Negative for color change, pallor and rash.   Neurological:  Positive for dizziness and light-headedness. Negative for weakness, numbness and headaches.   Hematological:  Negative for adenopathy. Does not bruise/bleed easily.   Psychiatric/Behavioral:  The patient is nervous/anxious.    All other systems reviewed and are negative.      Physical Exam  Physical Exam  Vitals and nursing note reviewed.   Constitutional:       Appearance: He is well-developed.   HENT:      Head: Normocephalic and atraumatic.      Right Ear: External ear normal.      Left Ear: External ear  normal.      Nose: Nose normal.   Eyes:      Conjunctiva/sclera: Conjunctivae normal.      Pupils: Pupils are equal, round, and reactive to light.   Cardiovascular:      Rate and Rhythm: Normal rate and regular rhythm.      Heart sounds: Normal heart sounds.   Pulmonary:      Effort: Pulmonary effort is normal. No respiratory distress.      Breath sounds: Normal breath sounds. No wheezing or rales.   Chest:      Chest wall: No tenderness.   Abdominal:      General: Bowel sounds are normal. There is no distension.      Palpations: Abdomen is soft.      Tenderness: There is no abdominal tenderness. There is no guarding.   Musculoskeletal:         General: Normal range of motion.      Cervical back: Normal range of motion and neck supple.   Skin:     General: Skin is warm and dry.   Neurological:      Mental Status: He is alert and oriented to person, place, and time.      Cranial Nerves: No cranial nerve deficit, dysarthria or facial asymmetry.      Sensory: No sensory deficit.      Motor: No weakness or pronator drift.      Coordination: Coordination normal. Finger-Nose-Finger Test normal.      Comments: Horizontal nystagmus present with [place]-Hallpike testing         Vital Signs  ED Triage Vitals   Temperature Pulse Respirations Blood Pressure SpO2   12/20/23 0536 12/20/23 0536 12/20/23 0536 12/20/23 0536 12/20/23 0536   97.7 °F (36.5 °C) 69 22 (!) 202/120 97 %      Temp Source Heart Rate Source Patient Position - Orthostatic VS BP Location FiO2 (%)   12/20/23 0536 12/20/23 0536 12/20/23 0536 12/20/23 0536 --   Temporal Monitor Lying Left arm       Pain Score       12/20/23 0556       6           Vitals:    12/20/23 0536 12/20/23 0556   BP: (!) 202/120 117/64   Pulse: 69 71   Patient Position - Orthostatic VS: Lying Lying         Visual Acuity  Visual Acuity      Flowsheet Row Most Recent Value   L Pupil Size (mm) 3   R Pupil Size (mm) 3            ED Medications  Medications   sodium chloride 0.9 % bolus 1,000 mL  (1,000 mL Intravenous New Bag 12/20/23 0552)   diazepam (VALIUM) injection 5 mg (5 mg Intravenous Given 12/20/23 0542)   nitroglycerin (NITROSTAT) SL tablet 0.4 mg (0.4 mg Sublingual Given 12/20/23 0542)   aspirin chewable tablet 324 mg (324 mg Oral Given 12/20/23 0542)       Diagnostic Studies  Results Reviewed       Procedure Component Value Units Date/Time    HS Troponin I 2hr [[id number]]     Lab Status: No result Specimen: Blood     HS Troponin 0hr (reflex protocol) [[id number]]  (Normal) Collected: 12/20/23 0541    Lab Status: Final result Specimen: Blood from Arm, Left Updated: 12/20/23 0613     hs TnI 0hr 6 ng/L     Protime-INR [[id number]]  (Normal) Collected: 12/20/23 0541    Lab Status: Final result Specimen: Blood from Arm, Left Updated: 12/20/23 0609     Protime 12.1 seconds      INR 0.86    APTT [[id number]]  (Normal) Collected: 12/20/23 0541    Lab Status: Final result Specimen: Blood from Arm, Left Updated: 12/20/23 0609     PTT 27 seconds     Comprehensive metabolic panel [[id number]]  (Abnormal) Collected: 12/20/23 0541    Lab Status: Final result Specimen: Blood from Arm, Left Updated: 12/20/23 0608     Sodium 136 mmol/L      Potassium 3.9 mmol/L      Chloride 105 mmol/L      CO2 23 mmol/L      ANION GAP 8 mmol/L      BUN 17 mg/dL      Creatinine 1.00 mg/dL      Glucose 197 mg/dL      Calcium 8.7 mg/dL      AST 17 U/L      ALT 20 U/L      Alkaline Phosphatase 35 U/L      Total Protein 7.0 g/dL      Albumin 4.0 g/dL      Total Bilirubin 0.54 mg/dL      eGFR 84 ml/min/1.73sq m     Narrative:      [employer] Kid[employer] guidelines for Chronic Kidney Disease (CKD):     Stage 1 with normal or high GFR (GFR > 90 mL/min/1.73 square meters)    Stage 2 Mild CKD (GFR = 60-89 mL/min/1.73 square meters)    Stage 3A Moderate CKD (GFR = 45-59 mL/min/1.73 square meters)    Stage 3B Moderate CKD (GFR = 30-44 mL/min/1.73 square meters)    Stage 4 Severe CKD (GFR = 15-29 mL/min/1.73 square meters)    Stage 5  End Stage CKD (GFR <15 mL/min/1.73 square meters)  Note: GFR calculation is accurate only with a steady state creatinine    Magnesium [[id number]]  (Normal) Collected: 12/20/23 0541    Lab Status: Final result Specimen: Blood from Arm, Left Updated: 12/20/23 0608     Magnesium 1.9 mg/dL     CBC and differential [[id number]]  (Abnormal) Collected: 12/20/23 0541    Lab Status: Final result Specimen: Blood from Arm, Left Updated: 12/20/23 0549     WBC 5.72 Thousand/uL      RBC 5.37 Million/uL      Hemoglobin 15.8 g/dL      Hematocrit 47.9 %      MCV 89 fL      MCH 29.4 pg      MCHC 33.0 g/dL      RDW 12.9 %      MPV 9.9 fL      Platelets 214 Thousands/uL      nRBC 0 /100 WBCs      Neutrophils Relative 54 %      Immat GRANS % 1 %      Lymphocytes Relative 23 %      Monocytes Relative 19 %      Eosinophils Relative 2 %      Basophils Relative 1 %      Neutrophils Absolute 3.19 Thousands/µL      Immature Grans Absolute 0.04 Thousand/uL      Lymphocytes Absolute 1.29 Thousands/µL      Monocytes Absolute 1.06 Thousand/µL      Eosinophils Absolute 0.10 Thousand/µL      Basophils Absolute 0.04 Thousands/µL     B-Type Natriuretic Peptide(BNP) [[id number]] Collected: 12/20/23 0541    Lab Status: In process Specimen: Blood from Arm, Left Updated: 12/20/23 0547    Fingerstick Glucose (POCT) [[id number]]  (Abnormal) Collected: 12/20/23 0541    Lab Status: Final result Updated: 12/20/23 0544     POC Glucose 182 mg/dl                    XR chest 1 view portable    (Results Pending)   CT head without contrast    (Results Pending)              Procedures  ECG 12 Lead Documentation Only    Date/Time: 12/20/2023 5:39 AM    Performed by: Bravo Zavala DO  Authorized by: Bravo Zavala DO    ECG reviewed by me, the ED Provider: yes    Patient location:  ED  Previous ECG:     Comparison to cardiac monitor: Yes    Quality:     Tracing quality:  Limited by artifact  Rate:     ECG rate:  74    ECG rate assessment: normal    Rhythm:     Rhythm:  sinus rhythm    QRS:     QRS axis:  Normal    QRS intervals:  Normal  Conduction:     Conduction: normal    ST segments:     ST segments:  Normal  T waves:     T waves: normal             ED Course  ED Course as of 12/20/23 0617   Wed Dec 20, 2023   0617 Case discussed and care transferred to Dr. Victoria pending lab and imaging results and disposition.              HEART Risk Score      Flowsheet Row Most Recent Value   Heart Score Risk Calculator    History 0 Filed at: 12/20/2023 0540   ECG 0 Filed at: 12/20/2023 0540   Age 1 Filed at: 12/20/2023 0540   Risk Factors 2 Filed at: 12/20/2023 0540   Troponin 0 Filed at: 12/20/2023 0540   HEART Score 3 Filed at: 12/20/2023 0540                          SBIRT 22yo+      Flowsheet Row Most Recent Value   Initial Alcohol Screen: US AUDIT-C     1. How often do you have a drink containing alcohol? 0 Filed at: 12/20/2023 0553   2. How many drinks containing alcohol do you have on a typical day you are drinking?  0 Filed at: 12/20/2023 0553   3a. Male UNDER 65: How often do you have five or more drinks on one occasion? 0 Filed at: 12/20/2023 0553   Audit-C Score 0 Filed at: 12/20/2023 0553   MIGN[place]: How many times in the past year have you...    Used an illegal drug or used a prescription medication for non-medical reasons? Never Filed at: 12/20/2023 0553                      Medical Decision Making  Problems Addressed:  Chest pain: acute illness or injury  HTN (hypertension): acute illness or injury  Vertigo: acute illness or injury    Amount and/or Complexity of Data Reviewed  Labs: ordered. Decision-making details documented in ED Course.  Radiology: ordered and independent interpretation performed. Decision-making details documented in ED Course.  ECG/medicine tests: ordered and independent interpretation performed. Decision-making details documented in ED Course.             Disposition  Final diagnoses:   Chest pain   HTN (hypertension)   Vertigo     Time reflects when  diagnosis was documented in both MDM as applicable and the Disposition within this note       Time User Action Codes Description Comment    12/20/2023  5:59 AM Bravo Zavala [R07.9] Chest pain     12/20/2023  5:59 AM Bravo Zavala [I10] HTN (hypertension)     12/20/2023  5:59 AM Bravo Zavala [R42] Vertigo           ED Disposition       None          Follow-up Information    None         Patient's Medications   Discharge Prescriptions    No medications on file       No discharge procedures on file.    PDMP Review       None            ED Provider  Electronically Signed by             Bravo Zavala DO  12/20/23 0617

## 2023-12-20 NOTE — DISCHARGE INSTRUCTIONS
We have adjusted your dose of Synthroid.  We have prescribed a medication to help with the dizziness.  We recommend that you use diphenhydramine as a sleep aid.  You may start with 12.5 mg of diphenhydramine and increase to 25 if the 12.5 is not helpful.    We do recommend follow-up with endocrinology.

## 2023-12-21 ENCOUNTER — TELEPHONE (OUTPATIENT)
Dept: ENDOCRINOLOGY | Facility: CLINIC | Age: 58
End: 2023-12-21

## 2024-10-25 ENCOUNTER — APPOINTMENT (EMERGENCY)
Dept: CT IMAGING | Facility: HOSPITAL | Age: 59
DRG: 111 | End: 2024-10-25
Payer: COMMERCIAL

## 2024-10-25 ENCOUNTER — HOSPITAL ENCOUNTER (INPATIENT)
Facility: HOSPITAL | Age: 59
LOS: 1 days | Discharge: HOME | DRG: 111 | End: 2024-10-27
Attending: EMERGENCY MEDICINE | Admitting: STUDENT IN AN ORGANIZED HEALTH CARE EDUCATION/TRAINING PROGRAM
Payer: COMMERCIAL

## 2024-10-25 DIAGNOSIS — R55 SYNCOPE, UNSPECIFIED SYNCOPE TYPE: ICD-10-CM

## 2024-10-25 DIAGNOSIS — R07.89 CHEST PRESSURE: ICD-10-CM

## 2024-10-25 DIAGNOSIS — I10 ESSENTIAL HYPERTENSION: ICD-10-CM

## 2024-10-25 DIAGNOSIS — Z79.4 TYPE 2 DIABETES MELLITUS WITH HYPERGLYCEMIA, WITH LONG-TERM CURRENT USE OF INSULIN (HCC): Primary | ICD-10-CM

## 2024-10-25 DIAGNOSIS — E11.65 TYPE 2 DIABETES MELLITUS WITH HYPERGLYCEMIA, WITH LONG-TERM CURRENT USE OF INSULIN (HCC): Primary | ICD-10-CM

## 2024-10-25 PROBLEM — R42 DIZZINESS: Status: ACTIVE | Noted: 2024-10-25

## 2024-10-25 PROBLEM — E03.9 HYPOTHYROIDISM: Status: ACTIVE | Noted: 2024-10-25

## 2024-10-25 LAB
2HR DELTA HS TROPONIN: 1 NG/L
ALBUMIN SERPL BCG-MCNC: 4 G/DL (ref 3.5–5)
ALP SERPL-CCNC: 47 U/L (ref 34–104)
ALT SERPL W P-5'-P-CCNC: 37 U/L (ref 7–52)
ANION GAP SERPL CALCULATED.3IONS-SCNC: 9 MMOL/L (ref 4–13)
APTT PPP: 26 SECONDS (ref 23–34)
AST SERPL W P-5'-P-CCNC: 37 U/L (ref 13–39)
ATRIAL RATE: 71 BPM
B-OH-BUTYR SERPL-MCNC: 0.67 MMOL/L (ref 0.02–0.27)
BACTERIA UR QL AUTO: ABNORMAL /HPF
BASE EX.OXY STD BLDV CALC-SCNC: 88.4 % (ref 60–80)
BASE EXCESS BLDV CALC-SCNC: -0.6 MMOL/L
BASOPHILS # BLD AUTO: 0.07 THOUSANDS/ΜL (ref 0–0.1)
BASOPHILS NFR BLD AUTO: 1 % (ref 0–1)
BILIRUB SERPL-MCNC: 0.74 MG/DL (ref 0.2–1)
BILIRUB UR QL STRIP: NEGATIVE
BNP SERPL-MCNC: 20 PG/ML (ref 0–100)
BUN SERPL-MCNC: 14 MG/DL (ref 5–25)
CALCIUM SERPL-MCNC: 8.3 MG/DL (ref 8.4–10.2)
CARDIAC TROPONIN I PNL SERPL HS: 12 NG/L
CARDIAC TROPONIN I PNL SERPL HS: 13 NG/L
CARDIAC TROPONIN I PNL SERPL HS: 14 NG/L (ref 8–18)
CHLORIDE SERPL-SCNC: 99 MMOL/L (ref 96–108)
CLARITY UR: CLEAR
CO2 SERPL-SCNC: 23 MMOL/L (ref 21–32)
COLOR UR: YELLOW
CREAT SERPL-MCNC: 1.18 MG/DL (ref 0.6–1.3)
EOSINOPHIL # BLD AUTO: 0.04 THOUSAND/ΜL (ref 0–0.61)
EOSINOPHIL NFR BLD AUTO: 0 % (ref 0–6)
ERYTHROCYTE [DISTWIDTH] IN BLOOD BY AUTOMATED COUNT: 12.7 % (ref 11.6–15.1)
EST. AVERAGE GLUCOSE BLD GHB EST-MCNC: 355 MG/DL
GFR SERPL CREATININE-BSD FRML MDRD: 68 ML/MIN/1.73SQ M
GLUCOSE SERPL-MCNC: 224 MG/DL (ref 65–140)
GLUCOSE SERPL-MCNC: 262 MG/DL (ref 65–140)
GLUCOSE SERPL-MCNC: 282 MG/DL (ref 65–140)
GLUCOSE SERPL-MCNC: 351 MG/DL (ref 65–140)
GLUCOSE SERPL-MCNC: 368 MG/DL (ref 65–140)
GLUCOSE UR STRIP-MCNC: ABNORMAL MG/DL
HBA1C MFR BLD: 14 %
HCO3 BLDV-SCNC: 20.8 MMOL/L (ref 24–30)
HCT VFR BLD AUTO: 47.7 % (ref 36.5–49.3)
HGB BLD-MCNC: 16.3 G/DL (ref 12–17)
HGB UR QL STRIP.AUTO: NEGATIVE
IMM GRANULOCYTES # BLD AUTO: 0.1 THOUSAND/UL (ref 0–0.2)
IMM GRANULOCYTES NFR BLD AUTO: 1 % (ref 0–2)
INR PPP: 0.91 (ref 0.85–1.19)
KETONES UR STRIP-MCNC: ABNORMAL MG/DL
LACTATE SERPL-SCNC: 1.6 MMOL/L (ref 0.5–2)
LEUKOCYTE ESTERASE UR QL STRIP: NEGATIVE
LYMPHOCYTES # BLD AUTO: 1.23 THOUSANDS/ΜL (ref 0.6–4.47)
LYMPHOCYTES NFR BLD AUTO: 13 % (ref 14–44)
MCH RBC QN AUTO: 30.2 PG (ref 26.8–34.3)
MCHC RBC AUTO-ENTMCNC: 34.2 G/DL (ref 31.4–37.4)
MCV RBC AUTO: 89 FL (ref 82–98)
MONOCYTES # BLD AUTO: 0.81 THOUSAND/ΜL (ref 0.17–1.22)
MONOCYTES NFR BLD AUTO: 9 % (ref 4–12)
NEUTROPHILS # BLD AUTO: 7.31 THOUSANDS/ΜL (ref 1.85–7.62)
NEUTS SEG NFR BLD AUTO: 76 % (ref 43–75)
NITRITE UR QL STRIP: NEGATIVE
NON-SQ EPI CELLS URNS QL MICRO: ABNORMAL /HPF
NRBC BLD AUTO-RTO: 0 /100 WBCS
O2 CT BLDV-SCNC: 21.2 ML/DL
P AXIS: 38 DEGREES
PCO2 BLDV: 27.2 MM HG (ref 42–50)
PH BLDV: 7.5 [PH] (ref 7.3–7.4)
PH UR STRIP.AUTO: 6 [PH]
PLATELET # BLD AUTO: 237 THOUSANDS/UL (ref 149–390)
PMV BLD AUTO: 10.1 FL (ref 8.9–12.7)
PO2 BLDV: 53.6 MM HG (ref 35–45)
POTASSIUM SERPL-SCNC: 3.7 MMOL/L (ref 3.5–5.3)
PR INTERVAL: 150 MS
PROT SERPL-MCNC: 6.7 G/DL (ref 6.4–8.4)
PROT UR STRIP-MCNC: NEGATIVE MG/DL
PROTHROMBIN TIME: 12.6 SECONDS (ref 12.3–15)
QRS AXIS: 31 DEGREES
QRSD INTERVAL: 90 MS
QT INTERVAL: 416 MS
QTC INTERVAL: 452 MS
RBC # BLD AUTO: 5.39 MILLION/UL (ref 3.88–5.62)
RBC #/AREA URNS AUTO: ABNORMAL /HPF
SODIUM SERPL-SCNC: 131 MMOL/L (ref 135–147)
SP GR UR STRIP.AUTO: 1.01 (ref 1–1.03)
T WAVE AXIS: 33 DEGREES
UROBILINOGEN UR QL STRIP.AUTO: 0.2 E.U./DL
VENTRICULAR RATE: 71 BPM
WBC # BLD AUTO: 9.56 THOUSAND/UL (ref 4.31–10.16)
WBC #/AREA URNS AUTO: ABNORMAL /HPF

## 2024-10-25 PROCEDURE — 74177 CT ABD & PELVIS W/CONTRAST: CPT

## 2024-10-25 PROCEDURE — 80053 COMPREHEN METABOLIC PANEL: CPT | Performed by: EMERGENCY MEDICINE

## 2024-10-25 PROCEDURE — 82805 BLOOD GASES W/O2 SATURATION: CPT | Performed by: EMERGENCY MEDICINE

## 2024-10-25 PROCEDURE — 85025 COMPLETE CBC W/AUTO DIFF WBC: CPT | Performed by: EMERGENCY MEDICINE

## 2024-10-25 PROCEDURE — 36415 COLL VENOUS BLD VENIPUNCTURE: CPT | Performed by: EMERGENCY MEDICINE

## 2024-10-25 PROCEDURE — 82010 KETONE BODYS QUAN: CPT | Performed by: EMERGENCY MEDICINE

## 2024-10-25 PROCEDURE — 99223 1ST HOSP IP/OBS HIGH 75: CPT

## 2024-10-25 PROCEDURE — 96374 THER/PROPH/DIAG INJ IV PUSH: CPT

## 2024-10-25 PROCEDURE — 93005 ELECTROCARDIOGRAM TRACING: CPT

## 2024-10-25 PROCEDURE — 84484 ASSAY OF TROPONIN QUANT: CPT | Performed by: EMERGENCY MEDICINE

## 2024-10-25 PROCEDURE — 70450 CT HEAD/BRAIN W/O DYE: CPT

## 2024-10-25 PROCEDURE — 96375 TX/PRO/DX INJ NEW DRUG ADDON: CPT

## 2024-10-25 PROCEDURE — 93010 ELECTROCARDIOGRAM REPORT: CPT | Performed by: INTERNAL MEDICINE

## 2024-10-25 PROCEDURE — 83605 ASSAY OF LACTIC ACID: CPT | Performed by: EMERGENCY MEDICINE

## 2024-10-25 PROCEDURE — 71275 CT ANGIOGRAPHY CHEST: CPT

## 2024-10-25 PROCEDURE — 99284 EMERGENCY DEPT VISIT MOD MDM: CPT

## 2024-10-25 PROCEDURE — 96360 HYDRATION IV INFUSION INIT: CPT

## 2024-10-25 PROCEDURE — 83880 ASSAY OF NATRIURETIC PEPTIDE: CPT | Performed by: EMERGENCY MEDICINE

## 2024-10-25 PROCEDURE — 82948 REAGENT STRIP/BLOOD GLUCOSE: CPT

## 2024-10-25 PROCEDURE — 96361 HYDRATE IV INFUSION ADD-ON: CPT

## 2024-10-25 PROCEDURE — 85610 PROTHROMBIN TIME: CPT | Performed by: EMERGENCY MEDICINE

## 2024-10-25 PROCEDURE — 85730 THROMBOPLASTIN TIME PARTIAL: CPT | Performed by: EMERGENCY MEDICINE

## 2024-10-25 PROCEDURE — 99223 1ST HOSP IP/OBS HIGH 75: CPT | Performed by: INTERNAL MEDICINE

## 2024-10-25 PROCEDURE — 81001 URINALYSIS AUTO W/SCOPE: CPT | Performed by: EMERGENCY MEDICINE

## 2024-10-25 PROCEDURE — 83036 HEMOGLOBIN GLYCOSYLATED A1C: CPT | Performed by: EMERGENCY MEDICINE

## 2024-10-25 RX ORDER — ONDANSETRON 2 MG/ML
4 INJECTION INTRAMUSCULAR; INTRAVENOUS ONCE
Status: COMPLETED | OUTPATIENT
Start: 2024-10-25 | End: 2024-10-25

## 2024-10-25 RX ORDER — INSULIN GLARGINE 100 [IU]/ML
20 INJECTION, SOLUTION SUBCUTANEOUS EVERY MORNING
Status: DISCONTINUED | OUTPATIENT
Start: 2024-10-26 | End: 2024-10-27 | Stop reason: HOSPADM

## 2024-10-25 RX ORDER — INSULIN LISPRO 100 [IU]/ML
2-12 INJECTION, SOLUTION INTRAVENOUS; SUBCUTANEOUS
Status: DISCONTINUED | OUTPATIENT
Start: 2024-10-25 | End: 2024-10-27 | Stop reason: HOSPADM

## 2024-10-25 RX ORDER — NITROGLYCERIN 0.4 MG/1
0.4 TABLET SUBLINGUAL ONCE
Status: COMPLETED | OUTPATIENT
Start: 2024-10-25 | End: 2024-10-25

## 2024-10-25 RX ORDER — INSULIN LISPRO 100 [IU]/ML
3 INJECTION, SOLUTION INTRAVENOUS; SUBCUTANEOUS
Status: DISCONTINUED | OUTPATIENT
Start: 2024-10-25 | End: 2024-10-26

## 2024-10-25 RX ORDER — KETOROLAC TROMETHAMINE 30 MG/ML
15 INJECTION, SOLUTION INTRAMUSCULAR; INTRAVENOUS ONCE
Status: COMPLETED | OUTPATIENT
Start: 2024-10-25 | End: 2024-10-25

## 2024-10-25 RX ORDER — AMLODIPINE BESYLATE 10 MG/1
10 TABLET ORAL 2 TIMES DAILY
Status: DISCONTINUED | OUTPATIENT
Start: 2024-10-25 | End: 2024-10-26

## 2024-10-25 RX ORDER — ASPIRIN 325 MG
325 TABLET ORAL ONCE
Status: COMPLETED | OUTPATIENT
Start: 2024-10-25 | End: 2024-10-25

## 2024-10-25 RX ORDER — MECLIZINE HYDROCHLORIDE 25 MG/1
25 TABLET ORAL 3 TIMES DAILY PRN
Status: DISCONTINUED | OUTPATIENT
Start: 2024-10-25 | End: 2024-10-27 | Stop reason: HOSPADM

## 2024-10-25 RX ORDER — SODIUM CHLORIDE 9 MG/ML
125 INJECTION, SOLUTION INTRAVENOUS CONTINUOUS
Status: DISCONTINUED | OUTPATIENT
Start: 2024-10-25 | End: 2024-10-26

## 2024-10-25 RX ORDER — HYDRALAZINE HYDROCHLORIDE 20 MG/ML
5 INJECTION INTRAMUSCULAR; INTRAVENOUS EVERY 6 HOURS PRN
Status: DISCONTINUED | OUTPATIENT
Start: 2024-10-25 | End: 2024-10-27 | Stop reason: HOSPADM

## 2024-10-25 RX ORDER — ACETAMINOPHEN 325 MG/1
650 TABLET ORAL EVERY 6 HOURS PRN
Status: DISCONTINUED | OUTPATIENT
Start: 2024-10-25 | End: 2024-10-27 | Stop reason: HOSPADM

## 2024-10-25 RX ORDER — HYDROCHLOROTHIAZIDE 25 MG/1
25 TABLET ORAL DAILY
Status: DISCONTINUED | OUTPATIENT
Start: 2024-10-26 | End: 2024-10-25

## 2024-10-25 RX ORDER — LEVOTHYROXINE SODIUM 100 UG/1
200 TABLET ORAL
Status: DISCONTINUED | OUTPATIENT
Start: 2024-10-26 | End: 2024-10-27 | Stop reason: HOSPADM

## 2024-10-25 RX ORDER — IRBESARTAN 300 MG/1
300 TABLET ORAL
COMMUNITY

## 2024-10-25 RX ORDER — LOSARTAN POTASSIUM 50 MG/1
100 TABLET ORAL
Status: DISCONTINUED | OUTPATIENT
Start: 2024-10-25 | End: 2024-10-27 | Stop reason: HOSPADM

## 2024-10-25 RX ORDER — ENOXAPARIN SODIUM 100 MG/ML
40 INJECTION SUBCUTANEOUS DAILY
Status: DISCONTINUED | OUTPATIENT
Start: 2024-10-26 | End: 2024-10-27 | Stop reason: HOSPADM

## 2024-10-25 RX ADMIN — SODIUM CHLORIDE 125 ML/HR: 0.9 INJECTION, SOLUTION INTRAVENOUS at 17:45

## 2024-10-25 RX ADMIN — KETOROLAC TROMETHAMINE 15 MG: 30 INJECTION, SOLUTION INTRAMUSCULAR at 12:21

## 2024-10-25 RX ADMIN — LOSARTAN POTASSIUM 100 MG: 50 TABLET, FILM COATED ORAL at 21:35

## 2024-10-25 RX ADMIN — ASPIRIN 325 MG ORAL TABLET 325 MG: 325 PILL ORAL at 16:15

## 2024-10-25 RX ADMIN — INSULIN LISPRO 6 UNITS: 100 INJECTION, SOLUTION INTRAVENOUS; SUBCUTANEOUS at 18:21

## 2024-10-25 RX ADMIN — SODIUM CHLORIDE 500 ML: 0.9 INJECTION, SOLUTION INTRAVENOUS at 12:22

## 2024-10-25 RX ADMIN — ONDANSETRON 4 MG: 2 INJECTION INTRAMUSCULAR; INTRAVENOUS at 12:21

## 2024-10-25 RX ADMIN — NITROGLYCERIN 0.4 MG: 0.4 TABLET, ORALLY DISINTEGRATING SUBLINGUAL at 16:15

## 2024-10-25 RX ADMIN — IOHEXOL 85 ML: 350 INJECTION, SOLUTION INTRAVENOUS at 13:04

## 2024-10-25 RX ADMIN — AMLODIPINE BESYLATE 10 MG: 10 TABLET ORAL at 17:45

## 2024-10-25 RX ADMIN — SODIUM CHLORIDE 1500 ML: 0.9 INJECTION, SOLUTION INTRAVENOUS at 13:36

## 2024-10-25 RX ADMIN — INSULIN LISPRO 3 UNITS: 100 INJECTION, SOLUTION INTRAVENOUS; SUBCUTANEOUS at 18:21

## 2024-10-25 NOTE — ASSESSMENT & PLAN NOTE
Continue amlodipine BID, and irbesartan HS  Hold morning hctz due to receiving ivf  Hydralazine prn   Adjust blood pressure medications as able

## 2024-10-25 NOTE — H&P
"H&P - Hospitalist   Name: Bertin Minor 58 y.o. male I MRN: [id number]  Unit/Bed#: TR 13B I Date of Admission: 10/25/2024   Date of Service: 10/25/2024 I Hospital Day: 0     Assessment & Plan  Dizziness  POA with dizziness and fall, denies syncope. accompanied by chest pressure. Does take meclizine prn but states that it doesn't help him   Negative troponins  Chest abdomen pelvis and head normal  EKG without signs of ischemia  Echo pending  PT/OT  Orthostatic blood pressure  Start IVF due to hyperglycemia with mildly elevated BHB  Telemetry  Adjust insulin due to hyperglycemia  Suspect hyperglycemia and hypertension to be cause of dizziness and fall   Chest pressure  FLORECITA 1  Troponin 12->13  No signs of ischemia on EKG   Continue to trend troponin  Telemetry   Type 2 diabetes mellitus with hyperglycemia, with long-term current use of insulin (HCC)  No results found for: \"HGBA1C\"    Recent Labs     10/25/24  1205 10/25/24  1456   POCGLU 351* 282*       Blood Sugar Average: Last 72 hrs:  (P) 316.5  Hold home metformin   Continue home lantus 20 units daily and humalog 3 units tid ac   Of note, patient does not take this consistently and has a very carb and salt heavy diet   Start ivf due to hyperglycemia with mildly elevated BHB on admission   Essential hypertension  Continue amlodipine BID, and irbesartan HS  Hold morning hctz due to receiving ivf  Hydralazine prn   Adjust blood pressure medications as able   Hypothyroidism  Continue levothyroxine      VTE Pharmacologic Prophylaxis:   Moderate Risk (Score 3-4) - Pharmacological DVT Prophylaxis Ordered: enoxaparin (Lovenox).  Code Status: No Order   Discussion with family: Patient declined call to .     Anticipated Length of Stay: Patient will be admitted on an observation basis with an anticipated length of stay of less than 2 midnights secondary to chest pain rule out ACS or arrhythmia.    History of Present Illness   Chief Complaint: \"I fell today but " "I dont think I passed out\"    Bertin Minor is a 58 y.o. male with a PMH of hypertension, hypothyroidism, type 2 diabetes who presents with dizziness and fall at home. Patient states that today he had dizzy episode and fell. Patient denies syncope or loss of consciousness. Patient took insulin and blood pressure medications today, but does not take insulin daily. Also admits to carb and salt heavy diet. Patient states he has been getting flutters in his chest, chest pressure and dizziness lately. States that his blood pressure and blood sugars have been inconsistent and he has been having a hard time managing these at home.     Review of Systems   Constitutional:  Positive for fatigue. Negative for fever.   HENT:  Negative for congestion and rhinorrhea.    Respiratory:  Positive for chest tightness. Negative for cough, shortness of breath and wheezing.    Cardiovascular:  Negative for chest pain and palpitations.   Gastrointestinal:  Positive for nausea. Negative for abdominal distention, abdominal pain, constipation, diarrhea and vomiting.   Genitourinary:  Negative for difficulty urinating and dysuria.   Musculoskeletal:  Negative for arthralgias and back pain.   Neurological:  Positive for dizziness. Negative for syncope, weakness, numbness and headaches.   Psychiatric/Behavioral:  Negative for agitation, behavioral problems and confusion.        Historical Information   Past Medical History:   Diagnosis Date    Disease of thyroid gland     Hypertension     Vertigo      History reviewed. No pertinent surgical history.  Social History     Tobacco Use    Smoking status: Never    Smokeless tobacco: Never   Vaping Use    Vaping status: Never Used   Substance and Sexual Activity    Alcohol use: Yes    Drug use: Never    Sexual activity: Not Currently     E-Cigarette/Vaping    E-Cigarette Use Never User      E-Cigarette/Vaping Substances     History reviewed. No pertinent family history.  Social History:  Marital " Status: /Civil Union   Patient Pre-hospital Living Situation: Home  Patient Pre-hospital Level of Mobility: walks  Patient Pre-hospital Diet Restrictions: none    Meds/Allergies   I have reviewed home medications with patient personally.  Prior to Admission medications    Medication Sig Start Date End Date Taking? Authorizing Provider   amLODIPine (NORVASC) 10 mg tablet Take 10 mg by mouth daily 8/25/23   Historical Provider, MD   hydrochlorothiazide (HYDRODIURIL) 25 mg tablet Take 25 mg by mouth daily 8/11/20 8/11/21  Historical Provider, MD   Insulin Glargine w/ Trans Port 100 UNIT/ML SOPN Inject 20 Units under the skin daily 8/25/23   Historical Provider, MD   Insulin Lispro w/ Trans Port 100 UNIT/ML SOPN Inject 3 Units under the skin 8/25/23   Historical Provider, MD   levothyroxine (Synthroid) 200 mcg tablet Take 1 tablet (200 mcg total) by mouth daily 12/20/23 3/19/24  Kar Victoria DO   meclizine (ANTIVERT) 25 mg tablet Take 1 tablet (25 mg total) by mouth 3 (three) times a day as needed for dizziness 12/20/23   Kar Victoria DO   metFORMIN (GLUCOPHAGE) 500 mg tablet Take 500 mg by mouth 2 (two) times a day with meals    Historical Provider, MD   multivitamin (THERAGRAN) TABS Take 1 tablet by mouth daily    Historical Provider, MD     Allergies   Allergen Reactions    Penicillins Rash       Objective :  Temp:  [97.3 °F (36.3 °C)] 97.3 °F (36.3 °C)  HR:  [67-71] 71  BP: (161-188)/() 180/110  Resp:  [14-22] 14  SpO2:  [96 %-98 %] 98 %  O2 Device: None (Room air)    Physical Exam  Vitals reviewed.   Constitutional:       General: He is not in acute distress.     Appearance: Normal appearance. He is obese. He is not ill-appearing.   HENT:      Head: Normocephalic and atraumatic.      Nose: Nose normal.      Mouth/Throat:      Mouth: Mucous membranes are dry.      Pharynx: Oropharynx is clear.   Eyes:      Extraocular Movements: Extraocular movements intact.      Conjunctiva/sclera: Conjunctivae  "normal.   Cardiovascular:      Rate and Rhythm: Normal rate and regular rhythm.      Pulses: Normal pulses.      Heart sounds: Normal heart sounds. No murmur heard.  Pulmonary:      Effort: Pulmonary effort is normal. No respiratory distress.      Breath sounds: Normal breath sounds. No wheezing.   Abdominal:      General: Abdomen is flat. Bowel sounds are normal. There is no distension.      Palpations: Abdomen is soft.      Tenderness: There is no abdominal tenderness. There is no guarding.   Musculoskeletal:         General: Normal range of motion.      Cervical back: Normal range of motion.      Right lower leg: No edema.      Left lower leg: No edema.   Skin:     General: Skin is warm.   Neurological:      General: No focal deficit present.      Mental Status: He is alert and oriented to person, place, and time. Mental status is at baseline.      Motor: No weakness.   Psychiatric:         Mood and Affect: Mood normal.         Behavior: Behavior normal.         Thought Content: Thought content normal.         Judgment: Judgment normal.          Lines/Drains:      Lab Results: I have reviewed the following results:  Results from last 7 days   Lab Units 10/25/24  1220   WBC Thousand/uL 9.56   HEMOGLOBIN g/dL 16.3   HEMATOCRIT % 47.7   PLATELETS Thousands/uL 237   SEGS PCT % 76*   LYMPHO PCT % 13*   MONO PCT % 9   EOS PCT % 0     Results from last 7 days   Lab Units 10/25/24  1220   SODIUM mmol/L 131*   POTASSIUM mmol/L 3.7   CHLORIDE mmol/L 99   CO2 mmol/L 23   BUN mg/dL 14   CREATININE mg/dL 1.18   ANION GAP mmol/L 9   CALCIUM mg/dL 8.3*   ALBUMIN g/dL 4.0   TOTAL BILIRUBIN mg/dL 0.74   ALK PHOS U/L 47   ALT U/L 37   AST U/L 37   GLUCOSE RANDOM mg/dL 368*     Results from last 7 days   Lab Units 10/25/24  1220   INR  0.91     Results from last 7 days   Lab Units 10/25/24  1456 10/25/24  1205   POC GLUCOSE mg/dl 282* 351*     No results found for: \"HGBA1C\"  Results from last 7 days   Lab Units 10/25/24  1220 "   LACTIC ACID mmol/L 1.6       Imaging Results Review: I reviewed radiology reports from this admission including: CT chest, CT abdomen/pelvis, and CT head.  Other Study Results Review: EKG was reviewed.     Administrative Statements       ** Please Note: This note has been constructed using a voice recognition system. **

## 2024-10-25 NOTE — ED PROVIDER NOTES
Time reflects when diagnosis was documented in both MDM as applicable and the Disposition within this note       Time User Action Codes Description Comment    10/25/2024  4:01 PM Newaygo, Stephanie Add [R07.89] Chest pressure     10/26/2024 12:19 AM Ivan Max Add [R55] Syncope, unspecified syncope type           ED Disposition       ED Disposition   Admit    Condition   Stable    Date/Time   Sat Oct 26, 2024 12:19 AM    Comment   Case was discussed with hospital medicine and the patient's admission status was agreed to be Admission Status: inpatient status to the service of Dr. Scott .               Assessment & Plan       Medical Decision Making  58-year-old male presents to the emergency department with complaint of syncope and collapse, differential diagnosis include hypertensive urgency, hypertensive emergency, intracranial process, ICH, SAH, ACS, cardiac dysrhythmia, hyperglycemia, DKA, dehydration, electrolyte abnormality, pneumonia, intra-abdominal infection, UTI, pyelonephritis, urolithiasis, nephrolithiasis, will perform CT head, chest abdomen pelvis, basic labs, provide fluids, antinausea medications, and reassess.    EKG performed at 12:03 PM, sinus rhythm with premature atrial complexes at a rate of 71, normal intervals, normal axis, no ST elevations throughout.    Discussed case hospital medicine. Patient will be admitted for further evaluation.    Amount and/or Complexity of Data Reviewed  Labs: ordered. Decision-making details documented in ED Course.  Radiology: ordered.    Risk  OTC drugs.  Prescription drug management.        ED Course as of 10/26/24 0019   Fri Oct 25, 2024   1439 POC Glucose(!): 351   1607 Patient made complaint of CP, was hypertensive, repeat EKG ordered, new troponin, sublingual nitroglycerin, and aspirin given.       Medications   sodium chloride 0.9 % infusion (has no administration in time range)   acetaminophen (TYLENOL) tablet 650 mg (has no administration in time range)    enoxaparin (LOVENOX) subcutaneous injection 40 mg (has no administration in time range)   amLODIPine (NORVASC) tablet 10 mg (has no administration in time range)   losartan (COZAAR) tablet 100 mg (has no administration in time range)   levothyroxine tablet 200 mcg (has no administration in time range)   meclizine (ANTIVERT) tablet 25 mg (has no administration in time range)   insulin lispro (HumALOG/ADMELOG) 100 units/mL subcutaneous injection 2-12 Units (has no administration in time range)   insulin glargine (LANTUS) subcutaneous injection 20 Units 0.2 mL (has no administration in time range)   insulin lispro (HumALOG/ADMELOG) 100 units/mL subcutaneous injection 3 Units (has no administration in time range)   hydrALAZINE (APRESOLINE) injection 5 mg (has no administration in time range)   sodium chloride 0.9 % bolus 500 mL (0 mL Intravenous Stopped 10/25/24 1336)   ondansetron (ZOFRAN) injection 4 mg (4 mg Intravenous Given 10/25/24 1221)   ketorolac (TORADOL) injection 15 mg (15 mg Intravenous Given 10/25/24 1221)   sodium chloride 0.9 % bolus 1,500 mL (0 mL Intravenous Stopped 10/25/24 1452)   iohexol (OMNIPAQUE) 350 MG/ML injection (MULTI-DOSE) 85 mL (85 mL Intravenous Given 10/25/24 1304)   nitroglycerin (NITROSTAT) SL tablet 0.4 mg (0.4 mg Sublingual Given 10/25/24 1615)   aspirin tablet 325 mg (325 mg Oral Given 10/25/24 1615)       ED Risk Strat Scores   HEART Risk Score      Flowsheet Row Most Recent Value   Heart Score Risk Calculator    History 0 Filed at: 10/25/2024 1214   ECG 0 Filed at: 10/25/2024 1214   Age 1 Filed at: 10/25/2024 1214   Risk Factors 2 Filed at: 10/25/2024 1214   Troponin 0 Filed at: 10/25/2024 1214   HEART Score 3 Filed at: 10/25/2024 1214                               SBIRT 22yo+      Flowsheet Row Most Recent Value   Initial Alcohol Screen: US AUDIT-C     1. How often do you have a drink containing alcohol? 0 Filed at: 10/25/2024 1200   2. How many drinks containing alcohol do you  have on a typical day you are drinking?  0 Filed at: 10/25/2024 1200   3a. Male UNDER 65: How often do you have five or more drinks on one occasion? 0 Filed at: 10/25/2024 1200   Audit-C Score 0 Filed at: 10/25/2024 1200   MIGNON: How many times in the past year have you...    Used an illegal drug or used a prescription medication for non-medical reasons? Never Filed at: 10/25/2024 1200          FLORECITA Risk Score      Flowsheet Row Most Recent Value   Age >= 65 0 Filed at: 10/25/2024 1556   Known CAD (stenosis >= 50%) 0 Filed at: 10/25/2024 1556   Recent (<=24 hrs) Service Angina 0 Filed at: 10/25/2024 1556   ST Deviation >= 0.5 mm 0 Filed at: 10/25/2024 1556   3+ CAD Risk Factors (FHx, HTN, HLP, DM, Smoker) 1 Filed at: 10/25/2024 1556   Aspirin Use Past 7 Days 0 Filed at: 10/25/2024 1556   Elevated Cardiac Markers 0 Filed at: 10/25/2024 1556   FLORECITA Risk Score (Calculated) 1 Filed at: 10/25/2024 1556                          History of Present Illness       Chief Complaint   Patient presents with    Syncope     Patient presents to the ED with complaints of  a syncopal episode that occurred this morning. The patient states he had an onset of dizziness that caused him to syncopize and fall. No LOC noted. The patient denies taking blood thinners. He reports mild abdominal pain and a headache.        Past Medical History:   Diagnosis Date    Disease of thyroid gland     Hypertension     Vertigo       History reviewed. No pertinent surgical history.   History reviewed. No pertinent family history.   Social History     Tobacco Use    Smoking status: Never    Smokeless tobacco: Never   Vaping Use    Vaping status: Never Used   Substance Use Topics    Alcohol use: Yes    Drug use: Never      E-Cigarette/Vaping    E-Cigarette Use Never User       E-Cigarette/Vaping Substances      I have reviewed and agree with the history as documented.     58-year-old male with past medical history of diabetes, hypertension, vertigo, presents  to the emergency department for syncope and collapse.  Patient states that he was working on his house with his son, patient had an episode where he felt very lightheaded, dizziness, lost consciousness, fell onto the ground, patient put his hand to the side of his head, protected his fall.  Patient denies any blood thinner.  Patient denies any vision changes, focal weakness, changes in sensation.  Patient denied any chest pain, shortness of breath, patient did complain of some bilateral lower abdominal discomfort with radiation into his back.  Patient reports some diarrhea, denies bloody stool, bloody urine, or pain with urination.  Patient does report some dark urine and dehydration.      Syncope  Associated symptoms: dizziness, nausea and weakness    Associated symptoms: no chest pain, no fever, no palpitations, no seizures, no shortness of breath and no vomiting        Review of Systems   Constitutional:  Negative for chills and fever.   HENT:  Negative for ear pain and sore throat.    Eyes:  Negative for pain and visual disturbance.   Respiratory:  Negative for cough and shortness of breath.    Cardiovascular:  Positive for syncope. Negative for chest pain and palpitations.   Gastrointestinal:  Positive for abdominal pain and nausea. Negative for vomiting.   Genitourinary:  Positive for decreased urine volume. Negative for dysuria and hematuria.   Musculoskeletal:  Negative for arthralgias and back pain.   Skin:  Negative for color change and rash.   Neurological:  Positive for dizziness, syncope, weakness and light-headedness. Negative for seizures.   All other systems reviewed and are negative.          Objective       ED Triage Vitals   Temperature Pulse Blood Pressure Respirations SpO2 Patient Position - Orthostatic VS   10/25/24 1221 10/25/24 1221 10/25/24 1221 10/25/24 1221 10/25/24 1221 10/25/24 1221   (!) 97.3 °F (36.3 °C) 67 (!) 188/89 18 96 % Lying      Temp Source Heart Rate Source BP Location FiO2  (%) Pain Score    10/25/24 1729 10/25/24 1221 10/25/24 1221 -- 10/25/24 1221    Axillary Monitor Left arm  2      Vitals      Date and Time Temp Pulse SpO2 Resp BP Pain Score FACES Pain Rating User   10/25/24 2318 -- 68 94 % -- 152/92 -- -- DII   10/25/24 2027 -- -- 95 % -- -- 3 -- MM   10/25/24 2000 96.6 °F (35.9 °C) 71 92 % 18 147/92 -- -- DII   10/25/24 1846 -- 78 98 % -- 186/109 -- -- DII   10/25/24 1844 -- 76 90 % -- 195/110 -- -- DII   10/25/24 1843 -- 76 98 % 18 172/105 -- -- DII   10/25/24 1736 -- -- -- -- -- 3 -- JK   10/25/24 1729 98 °F (36.7 °C) -- -- -- -- -- -- TT   10/25/24 1729 -- 74 98 % -- 180/104 -- -- DII   10/25/24 1630 -- 69 97 % 18 188/137 -- -- RR   10/25/24 1600 -- 73 98 % 12 217/111 4 -- RR   10/25/24 1530 -- 71 98 % 14 180/110 -- -- RR   10/25/24 1500 -- 70 96 % 14 162/95 -- -- RR   10/25/24 1430 -- 71 97 % 22 161/92 -- -- RR   10/25/24 1221 97.3 °F (36.3 °C) 67 96 % 18 188/89 2 -- RR            Physical Exam  Vitals and nursing note reviewed.   Constitutional:       General: He is not in acute distress.     Appearance: He is well-developed.   HENT:      Head: Normocephalic and atraumatic.   Eyes:      Conjunctiva/sclera: Conjunctivae normal.   Cardiovascular:      Rate and Rhythm: Normal rate and regular rhythm.      Heart sounds: No murmur heard.  Pulmonary:      Effort: Pulmonary effort is normal. No respiratory distress.      Breath sounds: Normal breath sounds.   Abdominal:      Palpations: Abdomen is soft.      Tenderness: There is no abdominal tenderness.   Musculoskeletal:         General: No swelling.      Cervical back: Neck supple.   Skin:     General: Skin is warm and dry.      Capillary Refill: Capillary refill takes less than 2 seconds.   Neurological:      Mental Status: He is alert.   Psychiatric:         Mood and Affect: Mood normal.         Results Reviewed       Procedure Component Value Units Date/Time    Hemoglobin A1C [[id number]]  (Abnormal) Collected: 10/25/24 1222     Lab Status: Final result Specimen: Blood from Arm, Right Updated: 10/25/24 2144     Hemoglobin A1C 14.0 %       mg/dl     High Sensitivity Troponin I Random [[id number]]  (Normal) Collected: 10/25/24 1609    Lab Status: Final result Specimen: Blood from Arm, Left Updated: 10/25/24 1637     HS TnI random 14 ng/L     Urinalysis with microscopic [[id number]]  (Abnormal) Collected: 10/25/24 1536    Lab Status: Final result Specimen: Urine, Clean Catch Updated: 10/25/24 1606     Color, UA Yellow     Clarity, UA Clear     Specific Gravity, UA 1.010     pH, UA 6.0     Leukocytes, UA Negative     Nitrite, UA Negative     Protein, UA Negative mg/dl      Glucose,  (1/2%) mg/dl      Ketones, UA 15 (1+) mg/dl      Urobilinogen, UA 0.2 E.U./dl      Bilirubin, UA Negative     Occult Blood, UA Negative     RBC, UA None Seen /hpf      WBC, UA None Seen /hpf      Epithelial Cells None Seen /hpf      Bacteria, UA None Seen /hpf     HS Troponin I 2hr [[id number]]  (Normal) Collected: 10/25/24 1457    Lab Status: Final result Specimen: Blood from Arm, Left Updated: 10/25/24 1523     hs TnI 2hr 13 ng/L      Delta 2hr hsTnI 1 ng/L     Fingerstick Glucose (POCT) [[id number]]  (Abnormal) Collected: 10/25/24 1456    Lab Status: Final result Specimen: Blood Updated: 10/25/24 1457     POC Glucose 282 mg/dl     HS Troponin I 4hr [[id number]]     Lab Status: No result Specimen: Blood     HS Troponin 0hr (reflex protocol) [[id number]]  (Normal) Collected: 10/25/24 1220    Lab Status: Final result Specimen: Blood from Arm, Right Updated: 10/25/24 1258     hs TnI 0hr 12 ng/L     B-Type Natriuretic Peptide(BNP) [[id number]]  (Normal) Collected: 10/25/24 1220    Lab Status: Final result Specimen: Blood from Arm, Right Updated: 10/25/24 1257     BNP 20 pg/mL     Beta Hydroxybutyrate [[id number]]  (Abnormal) Collected: 10/25/24 1220    Lab Status: Final result Specimen: Blood from Arm, Right Updated: 10/25/24 1251     Beta- Hydroxybutyrate 0.67  mmol/L     Comprehensive metabolic panel [[id number]]  (Abnormal) Collected: 10/25/24 1220    Lab Status: Final result Specimen: Blood from Arm, Right Updated: 10/25/24 1251     Sodium 131 mmol/L      Potassium 3.7 mmol/L      Chloride 99 mmol/L      CO2 23 mmol/L      ANION GAP 9 mmol/L      BUN 14 mg/dL      Creatinine 1.18 mg/dL      Glucose 368 mg/dL      Calcium 8.3 mg/dL      AST 37 U/L      ALT 37 U/L      Alkaline Phosphatase 47 U/L      Total Protein 6.7 g/dL      Albumin 4.0 g/dL      Total Bilirubin 0.74 mg/dL      eGFR 68 ml/min/1.73sq m     Narrative:      [employer] guidelines for Chronic Kidney Disease (CKD):     Stage 1 with normal or high GFR (GFR > 90 mL/min/1.73 square meters)    Stage 2 Mild CKD (GFR = 60-89 mL/min/1.73 square meters)    Stage 3A Moderate CKD (GFR = 45-59 mL/min/1.73 square meters)    Stage 3B Moderate CKD (GFR = 30-44 mL/min/1.73 square meters)    Stage 4 Severe CKD (GFR = 15-29 mL/min/1.73 square meters)    Stage 5 End Stage CKD (GFR <15 mL/min/1.73 square meters)  Note: GFR calculation is accurate only with a steady state creatinine    Lactic acid, plasma (w/reflex if result > 2.0) [[id number]]  (Normal) Collected: 10/25/24 1220    Lab Status: Final result Specimen: Blood from Arm, Right Updated: 10/25/24 1251     LACTIC ACID 1.6 mmol/L     Narrative:      Result may be elevated if tourniquet was used during collection.    Protime-INR [[id number]]  (Normal) Collected: 10/25/24 1220    Lab Status: Final result Specimen: Blood from Arm, Right Updated: 10/25/24 1244     Protime 12.6 seconds      INR 0.91    Narrative:      INR Therapeutic Range    Indication                                             INR Range      Atrial Fibrillation                                               2.0-3.0  Hypercoagulable State                                    2.0.2.3  Left Ventricular Asist Device                            2.0-3.0  Mechanical Heart Valve                                   -    Aortic(with afib, MI, embolism, HF, LA enlargement,    and/or coagulopathy)                                     2.0-3.0 (2.5-3.5)     Mitral                                                             2.5-3.5  Prosthetic/Bioprosthetic Heart Valve               2.0-3.0  Venous thromboembolism (VTE: VT, PE        2.0-3.0    APTT [[id number]]  (Normal) Collected: 10/25/24 1220    Lab Status: Final result Specimen: Blood from Arm, Right Updated: 10/25/24 1244     PTT 26 seconds     CBC and differential [[id number]]  (Abnormal) Collected: 10/25/24 1220    Lab Status: Final result Specimen: Blood from Arm, Right Updated: 10/25/24 1231     WBC 9.56 Thousand/uL      RBC 5.39 Million/uL      Hemoglobin 16.3 g/dL      Hematocrit 47.7 %      MCV 89 fL      MCH 30.2 pg      MCHC 34.2 g/dL      RDW 12.7 %      MPV 10.1 fL      Platelets 237 Thousands/uL      nRBC 0 /100 WBCs      Segmented % 76 %      Immature Grans % 1 %      Lymphocytes % 13 %      Monocytes % 9 %      Eosinophils Relative 0 %      Basophils Relative 1 %      Absolute Neutrophils 7.31 Thousands/µL      Absolute Immature Grans 0.10 Thousand/uL      Absolute Lymphocytes 1.23 Thousands/µL      Absolute Monocytes 0.81 Thousand/µL      Eosinophils Absolute 0.04 Thousand/µL      Basophils Absolute 0.07 Thousands/µL     Blood gas, venous [[id number]]  (Abnormal) Collected: 10/25/24 1220    Lab Status: Final result Specimen: Blood from Arm, Right Updated: 10/25/24 1230     pH, Carlos 7.501     pCO2, Carlos 27.2 mm Hg      pO2, Carlos 53.6 mm Hg      HCO3, Carlos 20.8 mmol/L      Base Excess, Carlos -0.6 mmol/L      O2 Content, Carlos 21.2 ml/dL      O2 HGB, VENOUS 88.4 %     Fingerstick Glucose (POCT) [[id number]]  (Abnormal) Collected: 10/25/24 1205    Lab Status: Final result Specimen: Blood Updated: 10/25/24 1206     POC Glucose 351 mg/dl             CT pe study w abdomen pelvis w contrast   Final Interpretation by Shine Navarro MD (10/25 2958)      No  acute findings in the chest, abdomen or the pelvis.                     Workstation performed: [id number]         CT head without contrast   Final Interpretation by Shine Navarro MD (10/25 1337)      No acute intracranial abnormality.                  Workstation performed: [id number]             Procedures    ED Medication and Procedure Management   Prior to Admission Medications   Prescriptions Last Dose Informant Patient Reported? Taking?   Insulin Glargine w/ Trans Port 100 UNIT/ML SOPN 10/25/2024  Yes Yes   Sig: Inject 20 Units under the skin daily   Insulin Lispro w/ Trans Port 100 UNIT/ML SOPN 10/25/2024  Yes Yes   Sig: Inject 3 Units under the skin 3 (three) times a day With meals   amLODIPine (NORVASC) 10 mg tablet 10/25/2024  Yes Yes   Sig: Take 10 mg by mouth 2 (two) times a day   hydrochlorothiazide (HYDRODIURIL) 25 mg tablet 10/25/2024  Yes Yes   Sig: Take 25 mg by mouth daily   irbesartan (AVAPRO) 300 mg tablet 10/24/2024  Yes Yes   Sig: Take 300 mg by mouth daily at bedtime   levothyroxine (Synthroid) 200 mcg tablet 10/25/2024  No Yes   Sig: Take 1 tablet (200 mcg total) by mouth daily   meclizine (ANTIVERT) 25 mg tablet Past Week  No Yes   Sig: Take 1 tablet (25 mg total) by mouth 3 (three) times a day as needed for dizziness   metFORMIN (GLUCOPHAGE) 500 mg tablet 10/25/2024  Yes Yes   Sig: Take 500 mg by mouth 2 (two) times a day with meals   multivitamin (THERAGRAN) TABS 10/25/2024  Yes Yes   Sig: Take 1 tablet by mouth daily      Facility-Administered Medications: None     Current Discharge Medication List        CONTINUE these medications which have NOT CHANGED    Details   amLODIPine (NORVASC) 10 mg tablet Take 10 mg by mouth 2 (two) times a day      hydrochlorothiazide (HYDRODIURIL) 25 mg tablet Take 25 mg by mouth daily      Insulin Glargine w/ Trans Port 100 UNIT/ML SOPN Inject 20 Units under the skin daily      Insulin Lispro w/ Trans Port 100 UNIT/ML SOPN Inject 3 Units  under the skin 3 (three) times a day With meals      irbesartan (AVAPRO) 300 mg tablet Take 300 mg by mouth daily at bedtime      levothyroxine (Synthroid) 200 mcg tablet Take 1 tablet (200 mcg total) by mouth daily  Qty: 90 tablet, Refills: 0    Associated Diagnoses: Hypothyroidism      meclizine (ANTIVERT) 25 mg tablet Take 1 tablet (25 mg total) by mouth 3 (three) times a day as needed for dizziness  Qty: 30 tablet, Refills: 2    Associated Diagnoses: Vertigo      metFORMIN (GLUCOPHAGE) 500 mg tablet Take 500 mg by mouth 2 (two) times a day with meals      multivitamin (THERAGRAN) TABS Take 1 tablet by mouth daily           No discharge procedures on file.  ED SEPSIS DOCUMENTATION   Time reflects when diagnosis was documented in both MDM as applicable and the Disposition within this note       Time User Action Codes Description Comment    10/25/2024  4:01 PM Stephanie Leyva Add [R07.89] Chest pressure     10/26/2024 12:19 AM Ivan Max Add [R55] Syncope, unspecified syncope type                  Ivan Mxa DO  10/26/24 0019

## 2024-10-25 NOTE — ASSESSMENT & PLAN NOTE
POA with dizziness and fall, denies syncope. accompanied by chest pressure. Does take meclizine prn but states that it doesn't help him   Negative troponins  Chest abdomen pelvis and head normal  EKG without signs of ischemia  Echo pending  PT/OT  Orthostatic blood pressure  Start IVF due to hyperglycemia with mildly elevated BHB  Telemetry  Adjust insulin due to hyperglycemia  Suspect hyperglycemia and hypertension to be cause of dizziness and fall

## 2024-10-25 NOTE — ASSESSMENT & PLAN NOTE
"No results found for: \"HGBA1C\"    Recent Labs     10/25/24  1205 10/25/24  1456   POCGLU 351* 282*       Blood Sugar Average: Last 72 hrs:  (P) 316.5  Hold home metformin   Continue home lantus 20 units daily and humalog 3 units tid ac   Of note, patient does not take this consistently and has a very carb and salt heavy diet   Start ivf due to hyperglycemia with mildly elevated BHB on admission   "

## 2024-10-25 NOTE — ASSESSMENT & PLAN NOTE
FLORECITA 1  Troponin 12->13  No signs of ischemia on EKG   Continue to trend troponin  Telemetry

## 2024-10-26 LAB
ANION GAP SERPL CALCULATED.3IONS-SCNC: 7 MMOL/L (ref 4–13)
B-OH-BUTYR SERPL-MCNC: <0.05 MMOL/L (ref 0.02–0.27)
BUN SERPL-MCNC: 12 MG/DL (ref 5–25)
CALCIUM SERPL-MCNC: 7.5 MG/DL (ref 8.4–10.2)
CHLORIDE SERPL-SCNC: 104 MMOL/L (ref 96–108)
CO2 SERPL-SCNC: 22 MMOL/L (ref 21–32)
CREAT SERPL-MCNC: 0.92 MG/DL (ref 0.6–1.3)
ERYTHROCYTE [DISTWIDTH] IN BLOOD BY AUTOMATED COUNT: 13.2 % (ref 11.6–15.1)
GFR SERPL CREATININE-BSD FRML MDRD: 92 ML/MIN/1.73SQ M
GLUCOSE SERPL-MCNC: 122 MG/DL (ref 65–140)
GLUCOSE SERPL-MCNC: 225 MG/DL (ref 65–140)
GLUCOSE SERPL-MCNC: 257 MG/DL (ref 65–140)
GLUCOSE SERPL-MCNC: 320 MG/DL (ref 65–140)
GLUCOSE SERPL-MCNC: 336 MG/DL (ref 65–140)
HCT VFR BLD AUTO: 44.4 % (ref 36.5–49.3)
HGB BLD-MCNC: 15.2 G/DL (ref 12–17)
MAGNESIUM SERPL-MCNC: 1.8 MG/DL (ref 1.9–2.7)
MCH RBC QN AUTO: 31 PG (ref 26.8–34.3)
MCHC RBC AUTO-ENTMCNC: 34.2 G/DL (ref 31.4–37.4)
MCV RBC AUTO: 91 FL (ref 82–98)
PLATELET # BLD AUTO: 215 THOUSANDS/UL (ref 149–390)
PMV BLD AUTO: 10 FL (ref 8.9–12.7)
POTASSIUM SERPL-SCNC: 3.5 MMOL/L (ref 3.5–5.3)
RBC # BLD AUTO: 4.9 MILLION/UL (ref 3.88–5.62)
SODIUM SERPL-SCNC: 133 MMOL/L (ref 135–147)
WBC # BLD AUTO: 7.73 THOUSAND/UL (ref 4.31–10.16)

## 2024-10-26 PROCEDURE — 82010 KETONE BODYS QUAN: CPT | Performed by: STUDENT IN AN ORGANIZED HEALTH CARE EDUCATION/TRAINING PROGRAM

## 2024-10-26 PROCEDURE — 82948 REAGENT STRIP/BLOOD GLUCOSE: CPT

## 2024-10-26 PROCEDURE — 80048 BASIC METABOLIC PNL TOTAL CA: CPT

## 2024-10-26 PROCEDURE — 99232 SBSQ HOSP IP/OBS MODERATE 35: CPT | Performed by: STUDENT IN AN ORGANIZED HEALTH CARE EDUCATION/TRAINING PROGRAM

## 2024-10-26 PROCEDURE — 85027 COMPLETE CBC AUTOMATED: CPT

## 2024-10-26 PROCEDURE — 97163 PT EVAL HIGH COMPLEX 45 MIN: CPT | Performed by: PHYSICAL THERAPIST

## 2024-10-26 PROCEDURE — 83735 ASSAY OF MAGNESIUM: CPT

## 2024-10-26 RX ORDER — MAGNESIUM SULFATE HEPTAHYDRATE 40 MG/ML
2 INJECTION, SOLUTION INTRAVENOUS ONCE
Status: COMPLETED | OUTPATIENT
Start: 2024-10-26 | End: 2024-10-26

## 2024-10-26 RX ORDER — HYDRALAZINE HYDROCHLORIDE 25 MG/1
25 TABLET, FILM COATED ORAL EVERY 8 HOURS SCHEDULED
Status: DISCONTINUED | OUTPATIENT
Start: 2024-10-26 | End: 2024-10-27 | Stop reason: HOSPADM

## 2024-10-26 RX ORDER — AMLODIPINE BESYLATE 10 MG/1
10 TABLET ORAL DAILY
Status: DISCONTINUED | OUTPATIENT
Start: 2024-10-27 | End: 2024-10-27 | Stop reason: HOSPADM

## 2024-10-26 RX ORDER — INSULIN LISPRO 100 [IU]/ML
7 INJECTION, SOLUTION INTRAVENOUS; SUBCUTANEOUS
Status: DISCONTINUED | OUTPATIENT
Start: 2024-10-26 | End: 2024-10-27 | Stop reason: HOSPADM

## 2024-10-26 RX ORDER — INSULIN GLARGINE 100 [IU]/ML
10 INJECTION, SOLUTION SUBCUTANEOUS
Status: DISCONTINUED | OUTPATIENT
Start: 2024-10-26 | End: 2024-10-27 | Stop reason: HOSPADM

## 2024-10-26 RX ADMIN — INSULIN LISPRO 3 UNITS: 100 INJECTION, SOLUTION INTRAVENOUS; SUBCUTANEOUS at 12:20

## 2024-10-26 RX ADMIN — AMLODIPINE BESYLATE 10 MG: 10 TABLET ORAL at 08:42

## 2024-10-26 RX ADMIN — LOSARTAN POTASSIUM 100 MG: 50 TABLET, FILM COATED ORAL at 21:43

## 2024-10-26 RX ADMIN — ENOXAPARIN SODIUM 40 MG: 40 INJECTION SUBCUTANEOUS at 08:41

## 2024-10-26 RX ADMIN — SODIUM CHLORIDE 125 ML/HR: 0.9 INJECTION, SOLUTION INTRAVENOUS at 01:29

## 2024-10-26 RX ADMIN — INSULIN GLARGINE 20 UNITS: 100 INJECTION, SOLUTION SUBCUTANEOUS at 08:41

## 2024-10-26 RX ADMIN — INSULIN LISPRO 3 UNITS: 100 INJECTION, SOLUTION INTRAVENOUS; SUBCUTANEOUS at 08:41

## 2024-10-26 RX ADMIN — MAGNESIUM SULFATE HEPTAHYDRATE 2 G: 40 INJECTION, SOLUTION INTRAVENOUS at 08:42

## 2024-10-26 RX ADMIN — INSULIN LISPRO 8 UNITS: 100 INJECTION, SOLUTION INTRAVENOUS; SUBCUTANEOUS at 12:20

## 2024-10-26 RX ADMIN — INSULIN LISPRO 8 UNITS: 100 INJECTION, SOLUTION INTRAVENOUS; SUBCUTANEOUS at 08:41

## 2024-10-26 RX ADMIN — HYDRALAZINE HYDROCHLORIDE 25 MG: 25 TABLET ORAL at 17:07

## 2024-10-26 RX ADMIN — SODIUM CHLORIDE 125 ML/HR: 0.9 INJECTION, SOLUTION INTRAVENOUS at 10:11

## 2024-10-26 RX ADMIN — INSULIN LISPRO 4 UNITS: 100 INJECTION, SOLUTION INTRAVENOUS; SUBCUTANEOUS at 17:00

## 2024-10-26 RX ADMIN — INSULIN GLARGINE 10 UNITS: 100 INJECTION, SOLUTION SUBCUTANEOUS at 21:41

## 2024-10-26 RX ADMIN — LEVOTHYROXINE SODIUM 200 MCG: 100 TABLET ORAL at 05:08

## 2024-10-26 RX ADMIN — INSULIN LISPRO 7 UNITS: 100 INJECTION, SOLUTION INTRAVENOUS; SUBCUTANEOUS at 17:16

## 2024-10-26 NOTE — PHYSICAL THERAPY NOTE
Physical Therapy Evaluation     Patient Name: Bertin Minor    Today's Date: 10/26/2024     Problem List  Principal Problem:    Dizziness  Active Problems:    Chest pressure    Type 2 diabetes mellitus with hyperglycemia, with long-term current use of insulin (HCC)    Essential hypertension    Hypothyroidism       Past Medical History  Past Medical History:   Diagnosis Date    Disease of thyroid gland     Hypertension     Vertigo         Past Surgical History  History reviewed. No pertinent surgical history.      10/26/24 1300   PT Last Visit   PT Visit Date 10/26/24   Note Type   Note type Evaluation   Pain Assessment   Pain Assessment Tool 0-10   Pain Score No Pain   Restrictions/Precautions   Other Precautions Multiple lines;Chair Alarm   Home Living   Type of Home House   Home Layout Two level  (12 NISREEN with HR)   Bathroom Shower/Tub Walk-in shower  (Grab Bars)   Bathroom Toilet Standard   Bathroom Equipment Grab bars in shower   Bathroom Accessibility Accessible   Home Equipment   (no DME at home)   Prior Function   Level of [place] Independent with ADLs;Independent with functional mobility;Independent with IADLS   Lives With Son   IADLs Independent with driving;Independent with meal prep;Independent with medication management   Falls in the last 6 months 0   Vocational Full time employment   Comments Pt drives works a contractor   Cognition   Overall Cognitive Status WFL   Arousal/Participation Alert   Orientation Level Oriented X4   Memory Within functional limits   Following Commands Follows all commands and directions without difficulty   Subjective   Subjective Pt is pleasant and agreeable to PT. He reports feeling better since last night BP was elevated in the ED. BP was 161/98 prior to the start of the session. RN verbalized the patient is appropriate for PT eval   RLE Assessment   RLE Assessment WFL  (4/5 grossly)   LLE Assessment   LLE  "Assessment WFL  (4/5 grossly)   Bed Mobility   Additional Comments Pt  Seated OOB in chair at the start of the session   Transfers   Sit to Stand 5  Supervision   Additional items Increased time required   Stand to Sit 5  Supervision   Additional items Increased time required   Stand pivot 5  Supervision   Additional items Increased time required   Additional Comments No AD, Pt reports mild bouts of dizness during amb this date, Reports \" he needs to take his time.\"   Ambulation/Elevation   Gait pattern Decreased hip extension;Decreased heel strike;Decreased toe off;Excessively slow;Foward flexed   Gait Assistance 5  Supervision   Assistive Device None   Distance 100'   Balance   Static Sitting Good   Dynamic Sitting Good   Static Standing Fair +   Dynamic Standing Fair +   Ambulatory Fair +   Endurance Deficit   Endurance Deficit Yes   Endurance Deficit Description Decreased activity tolerance   Activity Tolerance   Activity Tolerance Treatment limited secondary to medical complications (Comment)   Assessment   Prognosis Good   Problem List Decreased strength;Decreased range of motion;Decreased endurance;Impaired balance;Decreased mobility   Assessment Pt is 58 y.o. male seen for PT evaluation s/p admit to [facility] on 10/25/2024 w/ Dizziness. PT consulted to assess pt's functional mobility and d/c needs. Order placed for PT eval and tx, w/ up and OOB as tolerated order. Comorbidities affecting pt's physical performance at time of assessment include: Dizziness, syncope, and chest pressure  . PTA, pt was independent w/ all functional mobility w/ no AD  . Personal factors affecting pt at time of IE include: inability to ambulate household distances, inability to navigate community distances, inability to navigate level surfaces w/o external assistance, unable to perform dynamic tasks in community, unable to perform physical activity, and limited insight into impairments. Please find objective findings from PT assessment " regarding body systems outlined above with impairments and limitations including weakness, decreased endurance, gait deviations, decreased activity tolerance, and decreased functional mobility tolerance. Pt's clinical presentation is currently unstable/unpredictable seen in pt's presentation throughout the session . Pt to benefit from continued PT tx to address deficits as defined above and maximize level of functional independent mobility and consistency. From PT/mobility standpoint, recommendation at time of d/c would be Level 3 minimal resource intensity VS no needs pending progress in order to facilitate return to PLOF.   Goals   Patient Goals to get better   [facility] Expiration Date 11/09/24   Short Term Goal #1 Pt will be (I) with all transfers and bed mobility to help promote safe return to PLOF   Short Term Goal #2 Patient will safely ambulate 400 feet (I) to help promote safe return to PLOF   Plan   Treatment/Interventions ADL retraining;Functional transfer training;Therapeutic exercise;Elevations;LE strengthening/ROM;Bed mobility;Gait training   PT Frequency 3-5x/wk   Discharge Recommendation   Rehab Resource Intensity Level, PT III (Minimum Resource Intensity)   AM-PAC Basic Mobility Inpatient   Turning in Flat Bed Without Bedrails 4   Lying on Back to Sitting on Edge of Flat Bed Without Bedrails 3   Moving Bed to Chair 4   Standing Up From Chair Using Arms 3   Walk in Room 3   Climb 3-5 Stairs With Railing 3   Basic Mobility Inpatient Raw Score 20   Basic Mobility Standardized Score 43.99   [facility] Highest Level Of Mobility   -HLM Goal 6: Walk 10 steps or more   -HLM Achieved 7: Walk 25 feet or more       Patient seated in chair at the end of the session, chair alarm on. All lines intact, all needs within reach. Patients raw score on the [facility] Basic Mobility inpatient short form is 20, standardized score is 43.99, (greater than  42.9. patient's at this level are likely to benefit from D/C to home/  however, please refer to therapist recommendation for safe D/C Plan.

## 2024-10-26 NOTE — ASSESSMENT & PLAN NOTE
Resolved  FLORECITA 1  Troponin flat and negative  No signs of ischemia on EKG   Suspect likely 2/2 to uncontrolled HTN

## 2024-10-26 NOTE — UTILIZATION REVIEW
Initial Clinical Review - admitted as OBS 10/25/24, converted to Inpatient 10/26/24 due to uncontrolled DM2.    Admission: Date/Time/Statement:   Admission Orders (From admission, onward)       Ordered        10/26/24 1642  INPATIENT ADMISSION  Once            10/25/24 1552  Place in Observation  Once                          Orders Placed This Encounter   Procedures    INPATIENT ADMISSION     Standing Status:   Standing     Number of Occurrences:   1     Order Specific Question:   Level of Care     Answer:   Med Surg [16]     Order Specific Question:   Estimated length of stay     Answer:   More than 2 Midnights     Order Specific Question:   Certification     Answer:   I certify that inpatient services are medically necessary for this patient for a duration of greater than two midnights. See H&P and MD Progress Notes for additional information about the patient's course of treatment.     ED Arrival Information       Expected   -    Arrival   10/25/2024 11:46    Acuity   Urgent              Means of arrival   Walk-In    Escorted by   Self    Service   Hospitalist    Admission type   Emergency              Arrival complaint   syncope, fall, dizzy, HS, LOC, no BT             Chief Complaint   Patient presents with    Syncope     Patient presents to the ED with complaints of  a syncopal episode that occurred this morning. The patient states he had an onset of dizziness that caused him to syncopize and fall. No LOC noted. The patient denies taking blood thinners. He reports mild abdominal pain and a headache.        Initial Presentation: 58 y.o. male with a PMH of hypertension, hypothyroidism, type 2 diabetes who presents to the ED with complaint of dizziness and fall at home.  Patient denies syncope or loss of consciousness. Patient took insulin and blood pressure medications today, but does not take insulin daily. Also admits to carb and salt heavy diet. Patient states he has been getting flutters in his chest, chest  "pressure and dizziness lately. States that his blood pressure and blood sugars have been inconsistent and he has been having a hard time managing these at home.  ED glucose elevated at 368. Exam: AOx3.    10/25 Admit to Observation for evaluation and treatment of dizziness, chest pressure, DM2, HTN: Telemetry, trend troponin, ECHO, orthostatic blood pressure, start IVF, continue amlodipine and irbesartan, hold AM HCTZ,  adjust insulin.     10/26 Internal Medicine:  No acute overnight events.  Patient reports that he still feels slightly dizzy upon ambulating for long periods.  He states that the chest tightness he experienced on admission has resolved.    He reports \"memory fog\" that has persisted over the last few months.  Patient remains hyperglycemic, add Lantus 10 U QHS, increase Humalog to 7 U AC TID, continue home Lantus 20 units daily.  Orthostatic blood pressure negative, D/C IVF. Suspect hyperglycemia and hypertension to be cause of fall. Troponin flat and negative.  HCTZ discontinued due to side effects of hyperglycemia, start hydralazine 25 mg TID. Certification Statement: The patient will continue to require additional inpatient hospital stay due to uncontrolled DM 2       ED Treatment-Medication Administration from 10/25/2024 1146 to 10/25/2024 1721         Date/Time Order Dose Route Action     10/25/2024 1222 sodium chloride 0.9 % bolus 500 mL 500 mL Intravenous New Bag     10/25/2024 1221 ondansetron (ZOFRAN) injection 4 mg 4 mg Intravenous Given     10/25/2024 1221 ketorolac (TORADOL) injection 15 mg 15 mg Intravenous Given     10/25/2024 1336 sodium chloride 0.9 % bolus 1,500 mL 1,500 mL Intravenous New Bag     10/25/2024 1304 iohexol (OMNIPAQUE) 350 MG/ML injection (MULTI-DOSE) 85 mL 85 mL Intravenous Given     10/25/2024 1615 nitroglycerin (NITROSTAT) SL tablet 0.4 mg 0.4 mg Sublingual Given     10/25/2024 1615 aspirin tablet 325 mg 325 mg Oral Given            Scheduled " Medications:      amLODIPine, 10 mg, Oral, Daily  enoxaparin, 40 mg, Subcutaneous, Daily  hydrALAZINE, 25 mg, Oral, Q8H ROSE MARIE  insulin glargine, 10 Units, Subcutaneous, HS  insulin glargine, 20 Units, Subcutaneous, QAM  insulin lispro, 2-12 Units, Subcutaneous, TID AC  insulin lispro, 7 Units, Subcutaneous, TID With Meals  levothyroxine, 200 mcg, Oral, Early Morning  losartan, 100 mg, Oral, HS      magnesium sulfate 2 g/50 mL IVPB (premix) 2 g  Dose: 2 g  Freq: Once Route: IV  Last Dose: Stopped (10/26/24 1011)  Start: 10/26/24 0815 End: 10/26/24 1011      hydrALAZINE (APRESOLINE) tablet 25 mg  Dose: 25 mg  Freq: Every 8 hours scheduled Route: PO  Start: 10/26/24 1645 End: 10/27/24 1937      insulin glargine (LANTUS) subcutaneous injection 10 Units 0.1 mL  Dose: 10 Units  Freq: Daily at bedtime Route: SC  Start: 10/26/24 2200 End: 10/27/24 1937      Continuous IV Infusions:    sodium chloride 0.9 % infusion  Rate: 125 mL/hr Dose: 125 mL/hr  Freq: Continuous Route: IV  Last Dose: Stopped (10/26/24 1638)  Start: 10/25/24 1700 End: 10/26/24 1624      PRN Meds: None given.  acetaminophen, 650 mg, Oral, Q6H PRN  hydrALAZINE, 5 mg, Intravenous, Q6H PRN  meclizine, 25 mg, Oral, TID PRN      ED Triage Vitals [10/25/24 1221]   Temperature Pulse Respirations Blood Pressure SpO2 Pain Score   (!) 97.3 °F (36.3 °C) 67 18 (!) 188/89 96 % 2     Weight (last 2 days) before discharge       Date/Time Weight    10/26/24 0500 135 (296.96)    10/25/24 1221 134 (295.42)            Vital Signs (last 3 days) before discharge       Date/Time Temp Pulse Resp BP MAP (mmHg) SpO2 O2 Device Patient Position - Orthostatic VS [place] Coma Scale Score Pain    10/27/24 0900 -- -- -- -- -- -- None (Room air) -- 15 No Pain    10/27/24 07:42:14 96.6 °F (35.9 °C) 79 18 141/87 105 92 % -- -- -- No Pain    10/27/24 0010 -- -- -- 143/91 -- -- -- -- -- --    10/26/24 21:43:13 96.8 °F (36 °C) 70 18 121/70 87 94 % -- -- -- --    10/26/24 1945 -- -- -- -- -- --  -- -- 15 No Pain    10/26/24 19:37:34 -- 73 17 137/86 103 92 % None (Room air) -- -- --    10/26/24 15:02:24 96.6 °F (35.9 °C) 70 16 159/99 119 97 % -- -- -- --    10/26/24 1300 -- -- -- -- -- -- -- -- -- No Pain    10/26/24 12:30:16 -- 68 -- 161/98 119 97 % -- -- -- --    10/26/24 1100 97.2 °F (36.2 °C) 68 18 156/96 116 94 % -- Sitting -- --    10/26/24 0900 -- -- -- -- -- -- None (Room air) -- 15 No Pain    10/26/24 07:36:42 98 °F (36.7 °C) 70 18 161/97 118 90 % -- -- -- --    10/26/24 04:59:33 -- 65 18 163/97 119 94 % -- -- -- --    10/25/24 23:18:07 -- 68 -- 152/92 112 94 % -- -- -- --    10/25/24 2027 -- -- -- -- -- 95 % None (Room air) -- 15 3    10/25/24 20:00:28 96.6 °F (35.9 °C) 71 18 147/92 110 92 % -- -- -- --    10/25/24 18:46:18 -- 78 -- 186/109 135 98 % -- Standing - Orthostatic VS -- --    10/25/24 18:44:50 -- 76 -- 195/110 138 90 % -- Sitting - Orthostatic VS -- --    10/25/24 18:43:34 -- 76 18 172/105 127 98 % -- Lying - Orthostatic VS -- --    10/25/24 1736 -- -- -- -- -- -- None (Room air) -- -- 3    10/25/24 17:29:55 98 °F (36.7 °C) 74 -- 180/104 129 98 % -- -- -- --    10/25/24 1630 -- 69 18 188/137 158 97 % None (Room air) Lying -- --    10/25/24 1600 -- 73 12 217/111 -- 98 % None (Room air) Lying -- 4    10/25/24 1530 -- 71 14 180/110 137 98 % None (Room air) Lying -- --    10/25/24 1500 -- 70 14 162/95 123 96 % None (Room air) Lying -- --    10/25/24 1430 -- 71 22 161/92 117 97 % None (Room air) Lying -- --    10/25/24 1339 -- -- -- -- -- -- -- -- 15 --    10/25/24 1221 97.3 °F (36.3 °C) 67 18 188/89 -- 96 % None (Room air) Lying -- 2              Pertinent Labs/Diagnostic Test Results:   Radiology:      CT pe study w abdomen pelvis w contrast   Final Interpretation by Shine Navarro MD (10/25 0743)      No acute findings in the chest, abdomen or the pelvis.                     Workstation performed: [id number]         CT head without contrast   Final Interpretation by Shine  Yony Navarro MD (10/25 1337)      No acute intracranial abnormality.                  Workstation performed: [id number]           Cardiology:    10/25 ECG 12 lead     Final Result by Anthony Saldana Jr., MD (10/28 0831)   Sinus rhythm with marked sinus arrhythmia   Nonspecific T wave abnormality   Abnormal ECG   When compared with ECG of 25-OCT-2024 12:03,   Premature atrial complexes are no longer Present   Confirmed by Anthony Saldana ([id number]) on 10/28/2024 8:31:50 AM            Results from last 7 days   Lab Units 10/26/24  0445 10/25/24  1220   WBC Thousand/uL 7.73 9.56   HEMOGLOBIN g/dL 15.2 16.3   HEMATOCRIT % 44.4 47.7   PLATELETS Thousands/uL 215 237   TOTAL NEUT ABS Thousands/µL  --  7.31         Results from last 7 days   Lab Units 10/27/24  0552 10/26/24  0445 10/25/24  1220   SODIUM mmol/L 134* 133* 131*   POTASSIUM mmol/L 3.6 3.5 3.7   CHLORIDE mmol/L 104 104 99   CO2 mmol/L 23 22 23   ANION GAP mmol/L 7 7 9   BUN mg/dL 11 12 14   CREATININE mg/dL 1.02 0.92 1.18   EGFR ml/min/1.73sq m 81 92 68   CALCIUM mg/dL 8.1* 7.5* 8.3*   MAGNESIUM mg/dL 2.0 1.8*  --      Results from last 7 days   Lab Units 10/25/24  1220   AST U/L 37   ALT U/L 37   ALK PHOS U/L 47   TOTAL PROTEIN g/dL 6.7   ALBUMIN g/dL 4.0   TOTAL BILIRUBIN mg/dL 0.74     Results from last 7 days   Lab Units 10/27/24  1132 10/27/24  0741 10/26/24  2054 10/26/24  1556 10/26/24  1103 10/26/24  0738 10/25/24  2144 10/25/24  1736 10/25/24  1456 10/25/24  1205   POC GLUCOSE mg/dl 247* 242* 122 225* 320* 336* 224* 262* 282* 351*     Results from last 7 days   Lab Units 10/27/24  0552 10/26/24  0445 10/25/24  1220   GLUCOSE RANDOM mg/dL 211* 257* 368*         Results from last 7 days   Lab Units 10/27/24  0601 10/25/24  1220   HEMOGLOBIN A1C % 13.8* 14.0*   EAG mg/dl 349 355     Beta- Hydroxybutyrate   Date Value Ref Range Status   10/26/2024 <0.05 0.02 - 0.27 mmol/L Final   10/25/2024 0.67 (H) 0.02 - 0.27 mmol/L Final          Results from  last 7 days   Lab Units 10/25/24  1220   PH DONNELL  7.501*   PCO2 DONNELL mm Hg 27.2*   PO2 DONNELL mm Hg 53.6*   HCO3 DONNELL mmol/L 20.8*   BASE EXC DONNELL mmol/L -0.6   O2 CONTENT DONNELL ml/dL 21.2   O2 HGB, VENOUS % 88.4*             Results from last 7 days   Lab Units 10/27/24  0601 10/25/24  1457 10/25/24  1220   HS TNI 0HR ng/L  --   --  12   HS TNI 2HR ng/L  --  13  --    HSTNI D2 ng/L  --  1  --    HS TNI 4HR ng/L 9  --   --          Results from last 7 days   Lab Units 10/25/24  1220   PROTIME seconds 12.6   INR  0.91   PTT seconds 26             Results from last 7 days   Lab Units 10/25/24  1220   LACTIC ACID mmol/L 1.6             Results from last 7 days   Lab Units 10/25/24  1220   BNP pg/mL 20               Results from last 7 days   Lab Units 10/25/24  1536   CLARITY UA  Clear   COLOR UA  Yellow   SPEC GRAV UA  1.010   PH UA  6.0   GLUCOSE UA mg/dl 500 (1/2%)*   KETONES UA mg/dl 15 (1+)*   BLOOD UA  Negative   PROTEIN UA mg/dl Negative   NITRITE UA  Negative   BILIRUBIN UA  Negative   UROBILINOGEN UA E.U./dl 0.2   LEUKOCYTES UA  Negative   WBC UA /hpf None Seen   RBC UA /hpf None Seen   BACTERIA UA /hpf None Seen   EPITHELIAL CELLS WET PREP /hpf None Seen         Past Medical History:   Diagnosis Date    Disease of thyroid gland     Hypertension     Vertigo          Admitting Diagnosis: Syncope [R55]  Chest pressure [R07.89]  Age/Sex: 58 y.o. male          Network Utilization Review Department  ATTENTION: Please call with any questions or concerns to [phone] and carefully listen to the prompts so that you are directed to the right person. All voicemails are confidential.   For Discharge needs, contact Care Management DC Support Team at [phone] opt. 2  Send all requests for admission clinical reviews, approved or denied determinations and any other requests to dedicated fax number below belonging to the campus where the patient is receiving treatment. List of dedicated fax numbers for the Facilities:  FACILITY  NAME UR FAX NUMBER   ADMISSION DENIALS (Administrative/Medical Necessity) [phone]   DISCHARGE SUPPORT TEAM (NETWORK) [phone]   PARENT CHILD HEALTH (Maternity/NICU/Pediatrics) [phone]   [facility] [phone]   [facility] [phone]   [facility] [phone]   [facility] [phone]   [facility] [phone]   [facility] [phone]   [facility] [phone]   [facility] [phone]   [facility] [phone]   [facility] [phone]   [facility] [phone]   [facility] [phone]

## 2024-10-26 NOTE — PLAN OF CARE
Problem: PHYSICAL THERAPY ADULT  Goal: Performs mobility at highest level of function for planned discharge setting.  See evaluation for individualized goals.  Description: Treatment/Interventions: ADL retraining, Functional transfer training, Therapeutic exercise, Elevations, LE strengthening/ROM, Bed mobility, Gait training          See flowsheet documentation for full assessment, interventions and recommendations.  Note: Prognosis: Good  Problem List: Decreased strength, Decreased range of motion, Decreased endurance, Impaired balance, Decreased mobility  Assessment: Pt is 58 y.o. male seen for PT evaluation s/p admit to [facility] on 10/25/2024 w/ Dizziness. PT consulted to assess pt's functional mobility and d/c needs. Order placed for PT eval and tx, w/ up and OOB as tolerated order. Comorbidities affecting pt's physical performance at time of assessment include: Dizziness, syncope, and chest pressure  . PTA, pt was independent w/ all functional mobility w/ no AD  . Personal factors affecting pt at time of IE include: inability to ambulate household distances, inability to navigate community distances, inability to navigate level surfaces w/o external assistance, unable to perform dynamic tasks in community, unable to perform physical activity, and limited insight into impairments. Please find objective findings from PT assessment regarding body systems outlined above with impairments and limitations including weakness, decreased endurance, gait deviations, decreased activity tolerance, and decreased functional mobility tolerance. Pt's clinical presentation is currently unstable/unpredictable seen in pt's presentation throughout the session . Pt to benefit from continued PT tx to address deficits as defined above and maximize level of functional independent mobility and consistency. From PT/mobility standpoint, recommendation at time of d/c would be Level 3 minimal resource intensity VS no needs pending progress in  order to facilitate return to PLOF.        Rehab Resource Intensity Level, PT: III (Minimum Resource Intensity)    See flowsheet documentation for full assessment.

## 2024-10-26 NOTE — PLAN OF CARE
Problem: PAIN - ADULT  Goal: Verbalizes/displays adequate comfort level or baseline comfort level  Description: Interventions:  - Encourage patient to monitor pain and request assistance  - Assess pain using appropriate pain scale  - Administer analgesics based on type and severity of pain and evaluate response  - Implement non-pharmacological measures as appropriate and evaluate response  - Consider cultural and social influences on pain and pain management  - Notify physician/advanced practitioner if interventions unsuccessful or patient reports new pain  Outcome: Progressing     Problem: SAFETY ADULT  Goal: Patient will remain free of falls  Description: INTERVENTIONS:  - Educate patient/family on patient safety including physical limitations  - Instruct patient to call for assistance with activity   - Consult OT/PT to assist with strengthening/mobility   - Keep Call bell within reach  - Keep bed low and locked with side rails adjusted as appropriate  - Keep care items and personal belongings within reach  - Initiate and maintain comfort rounds  - Make Fall Risk Sign visible to staff  - Offer Toileting every 2 Hours, in advance of need  - Initiate/Maintain bed alarm  - Apply yellow socks and bracelet for high fall risk patients  - Consider moving patient to room near nurses station  Outcome: Progressing     Problem: DISCHARGE PLANNING  Goal: Discharge to home or other facility with appropriate resources  Description: INTERVENTIONS:  - Identify barriers to discharge w/patient and caregiver  - Arrange for needed discharge resources and transportation as appropriate  - Identify discharge learning needs (meds, wound care, etc.)  - Arrange for interpretive services to assist at discharge as needed  - Refer to Case Management Department for coordinating discharge planning if the patient needs post-hospital services based on physician/advanced practitioner order or complex needs related to functional status, cognitive  ability, or social support system  Outcome: Progressing     Problem: CARDIOVASCULAR - ADULT  Goal: Maintains optimal cardiac output and hemodynamic stability  Description: INTERVENTIONS:  - Monitor I/O, vital signs and rhythm  - Monitor for S/S and trends of decreased cardiac output  - Administer and titrate ordered vasoactive medications to optimize hemodynamic stability  - Assess quality of pulses, skin color and temperature  - Assess for signs of decreased coronary artery perfusion  - Instruct patient to report change in severity of symptoms  Outcome: Progressing     Problem: CARDIOVASCULAR - ADULT  Goal: Absence of cardiac dysrhythmias or at baseline rhythm  Description: INTERVENTIONS:  - Continuous cardiac monitoring, vital signs, obtain 12 lead EKG if ordered  - Administer antiarrhythmic and heart rate control medications as ordered  - Monitor electrolytes and administer replacement therapy as ordered  Outcome: Progressing

## 2024-10-26 NOTE — PROGRESS NOTES
Progress Note - Hospitalist   Name: Bertin Minor 58 y.o. male I MRN: [id number]  Unit/Bed#: -01 I Date of Admission: 10/25/2024   Date of Service: 10/26/2024 I Hospital Day: 0    Assessment & Plan  Type 2 diabetes mellitus with hyperglycemia, with long-term current use of insulin (HCC)  Lab Results   Component Value Date    HGBA1C 14.0 (H) 10/25/2024       Recent Labs     10/25/24  1736 10/25/24  2144 10/26/24  0738 10/26/24  1103   POCGLU 262* 224* 336* 320*       Blood Sugar Average: Last 72 hrs:  (P) 295.3242110303665733  Hold home metformin   Patient remains hyperglycemic therefore will add Lantus 10U qhs   Increase Humalog to 7 U AC TID  Continue home lantus 20 units daily   Of note, patient does not take this consistently and has a very carb and salt heavy diet    Dizziness  POA with dizziness and fall, denies syncope. accompanied by chest pressure. Does take meclizine prn but states that it doesn't help him   Negative troponins  Chest abdomen pelvis and head normal  EKG without signs of ischemia  Echo pending  PT: Level 3  Orthostatic blood pressure negative for orthostatic hypotension  MIVF discontinued   Adjust insulin due to hyperglycemia  Suspect hyperglycemia and hypertension to be cause of dizziness and fall   Chest pressure  Resolved  FLORECITA 1  Troponin flat and negative  No signs of ischemia on EKG   Suspect likely 2/2 to uncontrolled HTN  Essential hypertension  Uncontrolled, Remains persistently elevated  Continue amlodipine BID, and irbesartan HS  IVF discontinued  HCTZ discontinued due to side effects of hyperglycemia  Hydralazine 25 mg TID  Adjust blood pressure medications as able   Hypothyroidism  Continue levothyroxine    VTE Pharmacologic Prophylaxis:   High Risk (Score >/= 5) - Pharmacological DVT Prophylaxis Ordered: enoxaparin (Lovenox). Sequential Compression Devices Ordered.    Mobility:   Basic Mobility Inpatient Raw Score: 20  JH-HLM Goal: 6: Walk 10 steps or more  JH-HLM  "Achieved: 7: Walk 25 feet or more  -HLM Goal achieved. Continue to encourage appropriate mobility.    Patient Centered Rounds: I performed bedside rounds with nursing staff today.   Discussions with Specialists or Other Care Team Provider: none    Education and Discussions with Family / Patient: Patient declined call to .     Current Length of Stay: 0 day(s)  Current Patient Status: Observation   Certification Statement: The patient will continue to require additional inpatient hospital stay due to uncontrolled DM 2  Discharge Plan: Anticipate discharge in 24-48 hrs to home.    Code Status: Level 1 - Full Code    Subjective   This is a 57 y/o M PMH of hypertension, hypothyroidism, type 2 diabetes that was admitted to the hospital on 10/25 due to dizziness.  Patient presented to the ED after an episode of dizziness and an unwitnessed fall.Patient denies any syncope or loss of consciousness.  On admission patient states that he has been compliant with his medication, he did take insulin and blood pressure medications.  He states that he does not take his insulin daily.  He also admitted to having a carb and salt heavy diet.  Patient reports that he has been having a hard time managing his blood pressure and blood sugars at home.  He states that he last saw his primary care doctor approximately 3 months ago.  Epic chart review shows patient used to follow with endocrinology in the past.    Today patient seen and examined at bedside.  No acute overnight events.  Patient reports that he still feels slightly dizzy upon ambulating for long periods.  He states that the chest tightness he experienced on admission has resolved.  He states that he lives at home by himself.  He reports \"memory fog\" that has persisted over the last few months.  Seen by PT who recommended at time of DC patient would be level 3 minimal resource intensity versus no needs.    Objective :  Temp:  [96.6 °F (35.9 °C)-98 °F (36.7 °C)] " 96.6 °F (35.9 °C)  HR:  [65-78] 70  BP: (147-217)/() 159/99  Resp:  [12-18] 16  SpO2:  [90 %-98 %] 97 %  O2 Device: None (Room air)    Body mass index is 47.93 kg/m².     Input and Output Summary (last 24 hours):     Intake/Output Summary (Last 24 hours) at 10/26/2024 1537  Last data filed at 10/26/2024 1428  Gross per 24 hour   Intake 1467.5 ml   Output --   Net 1467.5 ml       Physical Exam  Constitutional:       General: He is not in acute distress.     Appearance: Normal appearance. He is obese.   HENT:      Head: Normocephalic and atraumatic.   Eyes:      Extraocular Movements: Extraocular movements intact.      Pupils: Pupils are equal, round, and reactive to light.   Cardiovascular:      Rate and Rhythm: Normal rate and regular rhythm.      Pulses: Normal pulses.   Pulmonary:      Effort: Pulmonary effort is normal. No respiratory distress.      Breath sounds: Normal breath sounds. No wheezing.   Abdominal:      Palpations: Abdomen is soft.   Musculoskeletal:         General: Normal range of motion.   Skin:     General: Skin is warm and dry.   Neurological:      General: No focal deficit present.      Mental Status: He is alert and oriented to person, place, and time.         Lines/Drains:        Telemetry:  Telemetry Orders (From admission, onward)               24 Hour Telemetry Monitoring  Continuous x 24 Hours (Telem)        Expiring   Question:  Reason for 24 Hour Telemetry  Answer:  PCI/EP study (including pacer and ICD implementation), Cardiac surgery, MI, abnormal cardiac cath, and chest pain- rule out MI                     Telemetry Reviewed: Normal Sinus Rhythm  Indication for Continued Telemetry Use: No indication for continued use. Will discontinue.                Lab Results: I have reviewed the following results:   Results from last 7 days   Lab Units 10/26/24  0445 10/25/24  1220   WBC Thousand/uL 7.73 9.56   HEMOGLOBIN g/dL 15.2 16.3   HEMATOCRIT % 44.4 47.7   PLATELETS Thousands/uL 215  237   SEGS PCT %  --  76*   LYMPHO PCT %  --  13*   MONO PCT %  --  9   EOS PCT %  --  0     Results from last 7 days   Lab Units 10/26/24  0445 10/25/24  1220   SODIUM mmol/L 133* 131*   POTASSIUM mmol/L 3.5 3.7   CHLORIDE mmol/L 104 99   CO2 mmol/L 22 23   BUN mg/dL 12 14   CREATININE mg/dL 0.92 1.18   ANION GAP mmol/L 7 9   CALCIUM mg/dL 7.5* 8.3*   ALBUMIN g/dL  --  4.0   TOTAL BILIRUBIN mg/dL  --  0.74   ALK PHOS U/L  --  47   ALT U/L  --  37   AST U/L  --  37   GLUCOSE RANDOM mg/dL 257* 368*     Results from last 7 days   Lab Units 10/25/24  1220   INR  0.91     Results from last 7 days   Lab Units 10/26/24  1103 10/26/24  0738 10/25/24  2144 10/25/24  1736 10/25/24  1456 10/25/24  1205   POC GLUCOSE mg/dl 320* 336* 224* 262* 282* 351*     Results from last 7 days   Lab Units 10/25/24  1220   HEMOGLOBIN A1C % 14.0*     Results from last 7 days   Lab Units 10/25/24  1220   LACTIC ACID mmol/L 1.6       Recent Cultures (last 7 days):               Last 24 Hours Medication List:     Current Facility-Administered Medications:     acetaminophen (TYLENOL) tablet 650 mg, Q6H PRN    amLODIPine (NORVASC) tablet 10 mg, BID    enoxaparin (LOVENOX) subcutaneous injection 40 mg, Daily    hydrALAZINE (APRESOLINE) injection 5 mg, Q6H PRN    insulin glargine (LANTUS) subcutaneous injection 10 Units 0.1 mL, HS    insulin glargine (LANTUS) subcutaneous injection 20 Units 0.2 mL, QAM    insulin lispro (HumALOG/ADMELOG) 100 units/mL subcutaneous injection 2-12 Units, TID AC **AND** Fingerstick Glucose (POCT), TID AC    insulin lispro (HumALOG/ADMELOG) 100 units/mL subcutaneous injection 7 Units, TID With Meals    levothyroxine tablet 200 mcg, Early Morning    losartan (COZAAR) tablet 100 mg, HS    meclizine (ANTIVERT) tablet 25 mg, TID PRN    sodium chloride 0.9 % infusion, Continuous, Last Rate: 125 mL/hr (10/26/24 1011)    Administrative Statements   Today, Patient Was Seen By: Dana Clemons MD      **Please Note: This note  may have been constructed using a voice recognition system.**

## 2024-10-26 NOTE — PLAN OF CARE
Problem: CARDIOVASCULAR - ADULT  Goal: Maintains optimal cardiac output and hemodynamic stability  Description: INTERVENTIONS:  - Monitor I/O, vital signs and rhythm  - Monitor for S/S and trends of decreased cardiac output SOB dizziness lightheadedness  - Administer and titrate ordered vasoactive medications to optimize hemodynamic stability  - Assess quality of pulses, skin color and temperature  - Assess for signs of decreased coronary artery perfusion  - Instruct patient to report change in severity of symptoms  Outcome: Progressing

## 2024-10-26 NOTE — ASSESSMENT & PLAN NOTE
POA with dizziness and fall, denies syncope. accompanied by chest pressure. Does take meclizine prn but states that it doesn't help him   Negative troponins  Chest abdomen pelvis and head normal  EKG without signs of ischemia  Echo pending  PT: Level 3  Orthostatic blood pressure negative for orthostatic hypotension  MIVF discontinued   Adjust insulin due to hyperglycemia  Suspect hyperglycemia and hypertension to be cause of dizziness and fall

## 2024-10-26 NOTE — ASSESSMENT & PLAN NOTE
Lab Results   Component Value Date    HGBA1C 14.0 (H) 10/25/2024       Recent Labs     10/25/24  1736 10/25/24  2144 10/26/24  0738 10/26/24  1103   POCGLU 262* 224* 336* 320*       Blood Sugar Average: Last 72 hrs:  (P) 295.9426021218203617  Hold home metformin   Patient remains hyperglycemic therefore will add Lantus 10U qhs   Increase Humalog to 7 U AC TID  Continue home lantus 20 units daily   Of note, patient does not take this consistently and has a very carb and salt heavy diet

## 2024-10-26 NOTE — ASSESSMENT & PLAN NOTE
Uncontrolled, Remains persistently elevated  Continue amlodipine BID, and irbesartan HS  IVF discontinued  HCTZ discontinued due to side effects of hyperglycemia  Hydralazine 25 mg TID  Adjust blood pressure medications as able

## 2024-10-27 VITALS
DIASTOLIC BLOOD PRESSURE: 87 MMHG | WEIGHT: 296.96 LBS | HEART RATE: 79 BPM | RESPIRATION RATE: 18 BRPM | BODY MASS INDEX: 47.93 KG/M2 | TEMPERATURE: 96.6 F | OXYGEN SATURATION: 92 % | SYSTOLIC BLOOD PRESSURE: 141 MMHG

## 2024-10-27 LAB
ANION GAP SERPL CALCULATED.3IONS-SCNC: 7 MMOL/L (ref 4–13)
BUN SERPL-MCNC: 11 MG/DL (ref 5–25)
CALCIUM SERPL-MCNC: 8.1 MG/DL (ref 8.4–10.2)
CARDIAC TROPONIN I PNL SERPL HS: 9 NG/L
CHLORIDE SERPL-SCNC: 104 MMOL/L (ref 96–108)
CO2 SERPL-SCNC: 23 MMOL/L (ref 21–32)
CREAT SERPL-MCNC: 1.02 MG/DL (ref 0.6–1.3)
EST. AVERAGE GLUCOSE BLD GHB EST-MCNC: 349 MG/DL
GFR SERPL CREATININE-BSD FRML MDRD: 81 ML/MIN/1.73SQ M
GLUCOSE SERPL-MCNC: 211 MG/DL (ref 65–140)
GLUCOSE SERPL-MCNC: 242 MG/DL (ref 65–140)
GLUCOSE SERPL-MCNC: 247 MG/DL (ref 65–140)
HBA1C MFR BLD: 13.8 %
MAGNESIUM SERPL-MCNC: 2 MG/DL (ref 1.9–2.7)
POTASSIUM SERPL-SCNC: 3.6 MMOL/L (ref 3.5–5.3)
SODIUM SERPL-SCNC: 134 MMOL/L (ref 135–147)

## 2024-10-27 PROCEDURE — 83036 HEMOGLOBIN GLYCOSYLATED A1C: CPT

## 2024-10-27 PROCEDURE — 80048 BASIC METABOLIC PNL TOTAL CA: CPT | Performed by: STUDENT IN AN ORGANIZED HEALTH CARE EDUCATION/TRAINING PROGRAM

## 2024-10-27 PROCEDURE — 99239 HOSP IP/OBS DSCHRG MGMT >30: CPT | Performed by: STUDENT IN AN ORGANIZED HEALTH CARE EDUCATION/TRAINING PROGRAM

## 2024-10-27 PROCEDURE — 82948 REAGENT STRIP/BLOOD GLUCOSE: CPT

## 2024-10-27 PROCEDURE — 84484 ASSAY OF TROPONIN QUANT: CPT | Performed by: EMERGENCY MEDICINE

## 2024-10-27 PROCEDURE — 83735 ASSAY OF MAGNESIUM: CPT | Performed by: STUDENT IN AN ORGANIZED HEALTH CARE EDUCATION/TRAINING PROGRAM

## 2024-10-27 RX ORDER — BLOOD-GLUCOSE METER
KIT MISCELLANEOUS
Qty: 1 KIT | Refills: 0 | Status: SHIPPED | OUTPATIENT
Start: 2024-10-27

## 2024-10-27 RX ORDER — BLOOD SUGAR DIAGNOSTIC
STRIP MISCELLANEOUS
Qty: 200 EACH | Refills: 0 | Status: SHIPPED | OUTPATIENT
Start: 2024-10-27

## 2024-10-27 RX ORDER — INSULIN ASPART 100 [IU]/ML
7 INJECTION, SOLUTION INTRAVENOUS; SUBCUTANEOUS
Qty: 6.3 ML | Refills: 0 | Status: SHIPPED | OUTPATIENT
Start: 2024-10-27 | End: 2024-11-26

## 2024-10-27 RX ORDER — PEN NEEDLE, DIABETIC 32GX 5/32"
NEEDLE, DISPOSABLE MISCELLANEOUS
Qty: 100 EACH | Refills: 0 | Status: SHIPPED | OUTPATIENT
Start: 2024-10-27

## 2024-10-27 RX ORDER — HYDRALAZINE HYDROCHLORIDE 25 MG/1
25 TABLET, FILM COATED ORAL EVERY 8 HOURS SCHEDULED
Qty: 90 TABLET | Refills: 0 | Status: SHIPPED | OUTPATIENT
Start: 2024-10-27 | End: 2024-11-26

## 2024-10-27 RX ORDER — GLUCOSAMINE HCL/CHONDROITIN SU 500-400 MG
CAPSULE ORAL
Qty: 200 EACH | Refills: 0 | Status: SHIPPED | OUTPATIENT
Start: 2024-10-27

## 2024-10-27 RX ORDER — INSULIN GLARGINE 100 [IU]/ML
15 INJECTION, SOLUTION SUBCUTANEOUS 2 TIMES DAILY
Qty: 9 ML | Refills: 0 | Status: SHIPPED | OUTPATIENT
Start: 2024-10-27 | End: 2024-11-26

## 2024-10-27 RX ORDER — LANCETS 33 GAUGE
EACH MISCELLANEOUS
Qty: 200 EACH | Refills: 0 | Status: SHIPPED | OUTPATIENT
Start: 2024-10-27

## 2024-10-27 RX ORDER — TRAZODONE HYDROCHLORIDE 50 MG/1
50 TABLET, FILM COATED ORAL ONCE
Status: COMPLETED | OUTPATIENT
Start: 2024-10-27 | End: 2024-10-27

## 2024-10-27 RX ADMIN — INSULIN LISPRO 7 UNITS: 100 INJECTION, SOLUTION INTRAVENOUS; SUBCUTANEOUS at 08:30

## 2024-10-27 RX ADMIN — TRAZODONE HYDROCHLORIDE 50 MG: 50 TABLET ORAL at 01:48

## 2024-10-27 RX ADMIN — INSULIN LISPRO 4 UNITS: 100 INJECTION, SOLUTION INTRAVENOUS; SUBCUTANEOUS at 08:30

## 2024-10-27 RX ADMIN — LEVOTHYROXINE SODIUM 200 MCG: 100 TABLET ORAL at 05:50

## 2024-10-27 RX ADMIN — INSULIN GLARGINE 20 UNITS: 100 INJECTION, SOLUTION SUBCUTANEOUS at 08:36

## 2024-10-27 RX ADMIN — HYDRALAZINE HYDROCHLORIDE 25 MG: 25 TABLET ORAL at 00:10

## 2024-10-27 RX ADMIN — INSULIN LISPRO 7 UNITS: 100 INJECTION, SOLUTION INTRAVENOUS; SUBCUTANEOUS at 11:42

## 2024-10-27 RX ADMIN — AMLODIPINE BESYLATE 10 MG: 10 TABLET ORAL at 08:37

## 2024-10-27 RX ADMIN — ENOXAPARIN SODIUM 40 MG: 40 INJECTION SUBCUTANEOUS at 08:36

## 2024-10-27 RX ADMIN — INSULIN LISPRO 4 UNITS: 100 INJECTION, SOLUTION INTRAVENOUS; SUBCUTANEOUS at 11:42

## 2024-10-27 RX ADMIN — HYDRALAZINE HYDROCHLORIDE 25 MG: 25 TABLET ORAL at 08:37

## 2024-10-27 NOTE — NURSING NOTE
Reviewed discharge instructions, med rec, diabetic diet education , diabetes management insulin coverage, follow up appointments, worsening s/s, when to call PCP or return to ER, verbally understood

## 2024-10-27 NOTE — PLAN OF CARE
Problem: PAIN - ADULT  Goal: Verbalizes/displays adequate comfort level or baseline comfort level  Description: Interventions:  - Encourage patient to monitor pain and request assistance  - Assess pain using appropriate pain scale  - Administer analgesics based on type and severity of pain and evaluate response  - Implement non-pharmacological measures as appropriate and evaluate response  - Consider cultural and social influences on pain and pain management  - Notify physician/advanced practitioner if interventions unsuccessful or patient reports new pain  Outcome: Progressing     Problem: SAFETY ADULT  Goal: Patient will remain free of falls  Description: INTERVENTIONS:  - Educate patient/family on patient safety including physical limitations  - Instruct patient to call for assistance with activity   - Consult OT/PT to assist with strengthening/mobility   - Keep Call bell within reach  - Keep bed low and locked with side rails adjusted as appropriate  - Keep care items and personal belongings within reach  - Initiate and maintain comfort rounds  - Make Fall Risk Sign visible to staff  - Offer Toileting every 2 Hours, in advance of need  - Initiate/Maintain bed alarm  - Obtain necessary fall risk management equipment walker   - Apply yellow socks and bracelet for high fall risk patients  - Consider moving patient to room near nurses station  Outcome: Progressing  Goal: Maintain or return to baseline ADL function  Description: INTERVENTIONS:  -  Assess patient's ability to carry out ADLs; assess patient's baseline for ADL function and identify physical deficits which impact ability to perform ADLs (bathing, care of mouth/teeth, toileting, grooming, dressing, etc.)  - Assess/evaluate cause of self-care deficits   - Assess range of motion  - Assess patient's mobility; develop plan if impaired  - Assess patient's need for assistive devices and provide as appropriate  - Encourage maximum independence but intervene  and supervise when necessary  - Involve family in performance of ADLs  - Assess for home care needs following discharge   - Consider OT consult to assist with ADL evaluation and planning for discharge  - Provide patient education as appropriate  Outcome: Progressing  Goal: Maintains/Returns to pre admission functional level  Description: INTERVENTIONS:  - Perform AM-PAC 6 Click Basic Mobility/ Daily Activity assessment daily.  - Set and communicate daily mobility goal to care team and patient/family/caregiver.   - Collaborate with rehabilitation services on mobility goals if consulted  - Perform Range of Motion 3 times a day.  - Reposition patient every 2 hours.  - Dangle patient 2 times a day  - Stand patient 3 times a day  - Ambulate patient 3 times a day  - Out of bed to chair 3 times a day   - Out of bed for meals 3 times a day  - Out of bed for toileting  - Record patient progress and toleration of activity level   Outcome: Progressing     Problem: DISCHARGE PLANNING  Goal: Discharge to home or other facility with appropriate resources  Description: INTERVENTIONS:  - Identify barriers to discharge w/patient and caregiver  - Arrange for needed discharge resources and transportation as appropriate  - Identify discharge learning needs (meds, wound care, etc.)  - Arrange for interpretive services to assist at discharge as needed  - Refer to Case Management Department for coordinating discharge planning if the patient needs post-hospital services based on physician/advanced practitioner order or complex needs related to functional status, cognitive ability, or social support system  Outcome: Progressing     Problem: Knowledge Deficit  Goal: Patient/family/caregiver demonstrates understanding of disease process, treatment plan, medications, and discharge instructions  Description: Complete learning assessment and assess knowledge base.  Interventions:  - Provide teaching at level of understanding  - Provide teaching  via preferred learning methods  Outcome: Progressing     Problem: CARDIOVASCULAR - ADULT  Goal: Maintains optimal cardiac output and hemodynamic stability  Description: INTERVENTIONS:  - Monitor I/O, vital signs and rhythm  - Monitor for S/S and trends of decreased cardiac output SOB dizziness lightheadedness  - Administer and titrate ordered vasoactive medications to optimize hemodynamic stability  - Assess quality of pulses, skin color and temperature  - Assess for signs of decreased coronary artery perfusion  - Instruct patient to report change in severity of symptoms  Outcome: Progressing  Goal: Absence of cardiac dysrhythmias or at baseline rhythm  Description: INTERVENTIONS:  - Continuous cardiac monitoring, vital signs, obtain 12 lead EKG if ordered  - Administer antiarrhythmic and heart rate control medications as ordered  - Monitor electrolytes and administer replacement therapy as ordered  Outcome: Progressing

## 2024-10-27 NOTE — DISCHARGE INSTR - AVS FIRST PAGE
On the day of your discharge take 10 U of Lantus at night then start taking 15U of Lantus BID.  Take 7 U of Humalog with breakfast, lunch and dinner.  Please follow up with your PCP and endocrinology outpatient.

## 2024-10-27 NOTE — ASSESSMENT & PLAN NOTE
Improved.  POA with dizziness and fall, denies syncope. accompanied by chest pressure. Does take meclizine prn but states that it doesn't help him   Negative troponins  Chest abdomen pelvis and head normal  EKG without signs of ischemia  Recommend outpatient TTE  PT: Level 3  Orthostatic blood pressure negative for orthostatic hypotension  MIVF discontinued   Suspect hyperglycemia and hypertension to be cause of dizziness and fall

## 2024-10-27 NOTE — ASSESSMENT & PLAN NOTE
Improved with medication adjustment.   Continue amlodipine BID, and irbesartan HS  HCTZ discontinued due to side effects of hyperglycemia  Started on hydralazine 25 mg TID

## 2024-10-27 NOTE — CASE MANAGEMENT
Case Management Discharge Planning Note    Patient name Bertin Minor  Location /-01 MRN [id number]  : 1965 Date 10/27/2024       Current Admission Date: 10/25/2024  Current Admission Diagnosis:Dizziness   Patient Active Problem List    Diagnosis Date Noted Date Diagnosed    Dizziness 10/25/2024     Chest pressure 10/25/2024     Type 2 diabetes mellitus with hyperglycemia, with long-term current use of insulin (HCC) 10/25/2024     Essential hypertension 10/25/2024     Hypothyroidism 10/25/2024       LOS (days): 1  Geometric Mean LOS (GMLOS) (days):   Days to GMLOS:     OBJECTIVE:  Risk of Unplanned Readmission Score: 10.13         Current admission status: Inpatient   Preferred Pharmacy:   [facility] [address] [place] [address] [place] - [address] [facility]  [address] [facility] [address]  Phone: [phone] Fax: [phone]    Primary Care Provider: Joyce Delgado PA-C    Primary Insurance:   Secondary Insurance:     DISCHARGE DETAILS:       CM contacted patient in his room to discuss discharge home today.  CM discussed transport with patient and offered Uber, patient declined, indicating he will get ride home.    CM inquired if patient interested in [facility] for discharge, patient declined.

## 2024-10-27 NOTE — ASSESSMENT & PLAN NOTE
Lab Results   Component Value Date    HGBA1C 14.0 (H) 10/25/2024       Recent Labs     10/26/24  1556 10/26/24  2054 10/27/24  0741 10/27/24  1132   POCGLU 225* 122 242* 247*       Blood Sugar Average: Last 72 hrs:  (P) 261.1  Chronic, uncontrolled. A1c of 14 on admission  BG improved throughout admission on Lantus 20 U qAM and 10qhs, discharged home on Lantus 15 U BID and Humalog to 7 U AC TID  Patient used to previously follow-up with endocrinology, however has not seen them in the last year, recommend that patient reestablish with endocrinology outpatient.  Of note, patient does not take this consistently and has a very carb and salt heavy diet

## 2024-10-27 NOTE — DISCHARGE SUMMARY
Discharge Summary - Hospitalist   Name: Bertin Minor 58 y.o. male I MRN: [id number]  Unit/Bed#: -01 I Date of Admission: 10/25/2024   Date of Service: 10/27/2024 I Hospital Day: 1     Assessment & Plan  Type 2 diabetes mellitus with hyperglycemia, with long-term current use of insulin (HCC)  Lab Results   Component Value Date    HGBA1C 14.0 (H) 10/25/2024       Recent Labs     10/26/24  1556 10/26/24  2054 10/27/24  0741 10/27/24  1132   POCGLU 225* 122 242* 247*       Blood Sugar Average: Last 72 hrs:  (P) 261.1  Chronic, uncontrolled. A1c of 14 on admission  BG improved throughout admission on Lantus 20 U qAM and 10qhs, discharged home on Lantus 15 U BID and Humalog to 7 U AC TID  Patient used to previously follow-up with endocrinology, however has not seen them in the last year, recommend that patient reestablish with endocrinology outpatient.  Of note, patient does not take this consistently and has a very carb and salt heavy diet    Dizziness  Improved.  POA with dizziness and fall, denies syncope. accompanied by chest pressure. Does take meclizine prn but states that it doesn't help him   Negative troponins  Chest abdomen pelvis and head normal  EKG without signs of ischemia  Recommend outpatient TTE  PT: Level 3  Orthostatic blood pressure negative for orthostatic hypotension  MIVF discontinued   Suspect hyperglycemia and hypertension to be cause of dizziness and fall   Chest pressure  Resolved  FLORECITA 1  Troponin flat and negative  No signs of ischemia on EKG   Suspect likely 2/2 to uncontrolled HTN  Essential hypertension  Improved with medication adjustment.   Continue amlodipine BID, and irbesartan HS  HCTZ discontinued due to side effects of hyperglycemia  Started on hydralazine 25 mg TID    Hypothyroidism  Continue levothyroxine     Medical Problems       Resolved Problems  Date Reviewed: 7/22/2020   None       Discharging Physician / Practitioner: Dana Clemons MD  PCP: Joyce Delgado  [facility]  Admission Date:   Admission Orders (From admission, onward)       Ordered        10/26/24 1642  INPATIENT ADMISSION  Once            10/25/24 1552  Place in Observation  Once                          Discharge Date: 10/27/24    Consultations During Hospital Stay:  none    Procedures Performed:   none    Significant Findings / Test Results:   CT pe study w abdomen pelvis w contrast   Final Result by Shine Navarro MD (10/25 4366)      No acute findings in the chest, abdomen or the pelvis.                     Workstation performed: [id number]         CT head without contrast   Final Result by Shine Navarro MD (10/25 3535)      No acute intracranial abnormality.                  Workstation performed: [id number]                 Incidental Findings:   None    Test Results Pending at Discharge (will require follow up):   None     Outpatient Tests Requested:  TTE    Complications:  None    Reason for Admission: Dizziness    Hospital Course:   This is a 59 y/o M PMH of hypertension, hypothyroidism, type 2 diabetes that was admitted to the hospital on 10/25 due to dizziness.  Patient presented to the ED after an episode of dizziness and an unwitnessed fall.Patient denies any syncope or loss of consciousness.  On admission patient states that he has been compliant with his medication, he did take insulin and blood pressure medications.  He states that he does not take his insulin daily.  He also admitted to having a carb and salt heavy diet.  Patient reports that he has been having a hard time managing his blood pressure and blood sugars at home.  He states that he last saw his primary care doctor approximately 3 months ago.  Epic chart review shows patient used to follow with endocrinology in the past however has not seen them for approx 1 year.     Patient's insulin regimen was adjusted throughout hospitalization for tight glycemic control.  On the day of discharge patient's blood sugar's  were in low 200's, improved from admission at >300's.   Blood pressure improved with the addition of hydralazine 25 mg 3 times daily.  Advised patient to continue this and discharge.  On day of discharge patient was medically stable.  He did not complain of any dizziness or chest pressure.  Discussed with patient extensively the importance of tight glucose and blood pressure control.  Discussed with patient diabetic diet.  Advised patient to follow-up with endocrinology outpatient.    Please see above list of diagnoses and related plan for additional information.     Condition at Discharge: stable    Discharge Day Visit / Exam:   Subjective: Today seen and examined at bedside.  No acute events overnight.  Patient states that he feels much improved compared to admission.  He states that his dizziness has completely resolved any chest pressure.  His blood pressure and blood glucose are better controlled with medication adjustment.  No other acute complaints at this time.    Vitals: Blood Pressure: 141/87 (10/27/24 0742)  Pulse: 79 (10/27/24 0742)  Temperature: (!) 96.6 °F (35.9 °C) (10/27/24 0742)  Temp Source: Oral (10/26/24 1100)  Respirations: 18 (10/27/24 0742)  Weight - Scale: 135 kg (296 lb 15.4 oz) (10/26/24 0500)  SpO2: 92 % (10/27/24 0742)  Physical Exam  Constitutional:       General: He is not in acute distress.     Appearance: Normal appearance. He is obese. He is not toxic-appearing.   HENT:      Head: Normocephalic and atraumatic.   Eyes:      Extraocular Movements: Extraocular movements intact.      Pupils: Pupils are equal, round, and reactive to light.   Cardiovascular:      Rate and Rhythm: Normal rate and regular rhythm.      Heart sounds: Normal heart sounds. No murmur heard.     No gallop.   Pulmonary:      Effort: Pulmonary effort is normal. No respiratory distress.      Breath sounds: Normal breath sounds. No wheezing.   Abdominal:      General: Abdomen is flat.      Palpations: Abdomen is soft.    Musculoskeletal:         General: Normal range of motion.   Skin:     General: Skin is warm and dry.   Neurological:      General: No focal deficit present.      Mental Status: He is alert.     \    Discussion with Family: Patient declined call to .     Discharge instructions/Information to patient and family:   See after visit summary for information provided to patient and family.      Provisions for Follow-Up Care:  See after visit summary for information related to follow-up care and any pertinent home health orders.      Mobility at time of Discharge:   Basic Mobility Inpatient Raw Score: 24  JH-HLM Goal: 8: Walk 250 feet or more  JH-HLM Achieved: 7: Walk 25 feet or more  HLM Goal achieved. Continue to encourage appropriate mobility.     Disposition:   Home    Planned Readmission: no    Discharge Medications:  See after visit summary for reconciled discharge medications provided to patient and/or family.      Administrative Statements       **Please Note: This note may have been constructed using a voice recognition system**

## 2024-10-28 LAB
ATRIAL RATE: 62 BPM
P AXIS: 32 DEGREES
PR INTERVAL: 142 MS
QRS AXIS: 39 DEGREES
QRSD INTERVAL: 86 MS
QT INTERVAL: 442 MS
QTC INTERVAL: 448 MS
T WAVE AXIS: 49 DEGREES
VENTRICULAR RATE: 62 BPM